# Patient Record
Sex: FEMALE | Race: WHITE | NOT HISPANIC OR LATINO | Employment: FULL TIME | URBAN - METROPOLITAN AREA
[De-identification: names, ages, dates, MRNs, and addresses within clinical notes are randomized per-mention and may not be internally consistent; named-entity substitution may affect disease eponyms.]

---

## 2020-07-21 ENCOUNTER — TRANSCRIBE ORDERS (OUTPATIENT)
Dept: ADMINISTRATIVE | Facility: HOSPITAL | Age: 30
End: 2020-07-21

## 2020-07-21 DIAGNOSIS — Z31.41 FERTILITY TESTING: Primary | ICD-10-CM

## 2020-07-31 ENCOUNTER — HOSPITAL ENCOUNTER (OUTPATIENT)
Dept: GENERAL RADIOLOGY | Facility: HOSPITAL | Age: 30
Discharge: HOME | End: 2020-07-31
Admitting: SPECIALIST

## 2020-07-31 DIAGNOSIS — Z31.41 FERTILITY TESTING: ICD-10-CM

## 2020-07-31 PROCEDURE — 74740 X-RAY FEMALE GENITAL TRACT: CPT

## 2021-03-01 ENCOUNTER — LAB (OUTPATIENT)
Dept: LAB | Facility: HOSPITAL | Age: 31
End: 2021-03-01

## 2021-03-01 ENCOUNTER — TRANSCRIBE ORDERS (OUTPATIENT)
Dept: LAB | Facility: HOSPITAL | Age: 31
End: 2021-03-01

## 2021-03-01 DIAGNOSIS — Z32.00 PREGNANCY EXAMINATION OR TEST, PREGNANCY UNCONFIRMED: ICD-10-CM

## 2021-03-01 DIAGNOSIS — Z32.00 PREGNANCY EXAMINATION OR TEST, PREGNANCY UNCONFIRMED: Primary | ICD-10-CM

## 2021-03-01 LAB — HCG INTACT+B SERPL-ACNC: 12.39 MIU/ML

## 2021-03-01 PROCEDURE — 36415 COLL VENOUS BLD VENIPUNCTURE: CPT

## 2021-03-01 PROCEDURE — 84702 CHORIONIC GONADOTROPIN TEST: CPT

## 2021-03-05 ENCOUNTER — LAB (OUTPATIENT)
Dept: LAB | Facility: HOSPITAL | Age: 31
End: 2021-03-05

## 2021-03-05 DIAGNOSIS — Z32.00 PREGNANCY EXAMINATION OR TEST, PREGNANCY UNCONFIRMED: Primary | ICD-10-CM

## 2021-03-05 LAB — HCG INTACT+B SERPL-ACNC: 6.42 MIU/ML

## 2021-03-05 PROCEDURE — 36415 COLL VENOUS BLD VENIPUNCTURE: CPT

## 2021-03-05 PROCEDURE — 84702 CHORIONIC GONADOTROPIN TEST: CPT

## 2021-03-25 ENCOUNTER — LAB (OUTPATIENT)
Dept: LAB | Facility: HOSPITAL | Age: 31
End: 2021-03-25

## 2021-03-25 ENCOUNTER — TRANSCRIBE ORDERS (OUTPATIENT)
Dept: LAB | Facility: HOSPITAL | Age: 31
End: 2021-03-25

## 2021-03-25 DIAGNOSIS — O03.0: ICD-10-CM

## 2021-03-25 DIAGNOSIS — O03.0: Primary | ICD-10-CM

## 2021-03-25 LAB — HCG INTACT+B SERPL-ACNC: <0.5 MIU/ML

## 2021-03-25 PROCEDURE — 84702 CHORIONIC GONADOTROPIN TEST: CPT

## 2021-03-25 PROCEDURE — 36415 COLL VENOUS BLD VENIPUNCTURE: CPT

## 2021-07-01 ENCOUNTER — LAB (OUTPATIENT)
Dept: LAB | Facility: HOSPITAL | Age: 31
End: 2021-07-01

## 2021-07-01 ENCOUNTER — TRANSCRIBE ORDERS (OUTPATIENT)
Dept: LAB | Facility: HOSPITAL | Age: 31
End: 2021-07-01

## 2021-07-01 DIAGNOSIS — Z32.00 PREGNANCY EXAMINATION OR TEST, PREGNANCY UNCONFIRMED: Primary | ICD-10-CM

## 2021-07-01 DIAGNOSIS — Z32.00 PREGNANCY EXAMINATION OR TEST, PREGNANCY UNCONFIRMED: ICD-10-CM

## 2021-07-01 LAB — HCG INTACT+B SERPL-ACNC: 36.06 MIU/ML

## 2021-07-01 PROCEDURE — 36415 COLL VENOUS BLD VENIPUNCTURE: CPT

## 2021-07-01 PROCEDURE — 84702 CHORIONIC GONADOTROPIN TEST: CPT

## 2021-07-06 ENCOUNTER — TRANSCRIBE ORDERS (OUTPATIENT)
Dept: LAB | Facility: HOSPITAL | Age: 31
End: 2021-07-06

## 2021-07-06 ENCOUNTER — LAB (OUTPATIENT)
Dept: LAB | Facility: HOSPITAL | Age: 31
End: 2021-07-06

## 2021-07-06 DIAGNOSIS — Z32.01 PREGNANCY EXAMINATION OR TEST, POSITIVE RESULT: Primary | ICD-10-CM

## 2021-07-06 DIAGNOSIS — Z32.01 PREGNANCY EXAMINATION OR TEST, POSITIVE RESULT: ICD-10-CM

## 2021-07-06 LAB — HCG INTACT+B SERPL-ACNC: 292.9 MIU/ML

## 2021-07-06 PROCEDURE — 84702 CHORIONIC GONADOTROPIN TEST: CPT

## 2021-07-06 PROCEDURE — 36415 COLL VENOUS BLD VENIPUNCTURE: CPT

## 2021-08-16 ENCOUNTER — TRANSCRIBE ORDERS (OUTPATIENT)
Dept: LAB | Facility: HOSPITAL | Age: 31
End: 2021-08-16

## 2021-08-16 ENCOUNTER — LAB (OUTPATIENT)
Dept: LAB | Facility: HOSPITAL | Age: 31
End: 2021-08-16

## 2021-08-16 DIAGNOSIS — Z3A.10 10 WEEKS GESTATION OF PREGNANCY: ICD-10-CM

## 2021-08-16 DIAGNOSIS — Z34.81 PRENATAL CARE, SUBSEQUENT PREGNANCY, FIRST TRIMESTER: ICD-10-CM

## 2021-08-16 DIAGNOSIS — Z3A.10 10 WEEKS GESTATION OF PREGNANCY: Primary | ICD-10-CM

## 2021-08-16 LAB
ABO GROUP BLD: NORMAL
ALP SERPL-CCNC: 77 U/L (ref 39–117)
ALT SERPL W P-5'-P-CCNC: 12 U/L (ref 1–33)
AMPHET+METHAMPHET UR QL: NEGATIVE
AMPHETAMINES UR QL: NEGATIVE
AST SERPL-CCNC: 10 U/L (ref 1–32)
BARBITURATES UR QL SCN: NEGATIVE
BASOPHILS # BLD AUTO: 0.03 10*3/MM3 (ref 0–0.2)
BASOPHILS NFR BLD AUTO: 0.4 % (ref 0–1.5)
BENZODIAZ UR QL SCN: NEGATIVE
BILIRUB SERPL-MCNC: 0.3 MG/DL (ref 0–1.2)
BILIRUB UR QL STRIP: NEGATIVE
BILIRUB UR QL STRIP: NEGATIVE
BLD GP AB SCN SERPL QL: NEGATIVE
BUPRENORPHINE SERPL-MCNC: NEGATIVE NG/ML
CANNABINOIDS SERPL QL: NEGATIVE
CLARITY UR: CLEAR
CLARITY UR: CLEAR
COCAINE UR QL: NEGATIVE
COLOR UR: YELLOW
COLOR UR: YELLOW
CREAT SERPL-MCNC: 0.66 MG/DL (ref 0.57–1)
DEPRECATED RDW RBC AUTO: 41.2 FL (ref 37–54)
EOSINOPHIL # BLD AUTO: 0.09 10*3/MM3 (ref 0–0.4)
EOSINOPHIL NFR BLD AUTO: 1.2 % (ref 0.3–6.2)
ERYTHROCYTE [DISTWIDTH] IN BLOOD BY AUTOMATED COUNT: 13.7 % (ref 12.3–15.4)
GLUCOSE SERPL-MCNC: 99 MG/DL (ref 65–99)
GLUCOSE UR STRIP-MCNC: NEGATIVE MG/DL
GLUCOSE UR STRIP-MCNC: NEGATIVE MG/DL
HBV SURFACE AG SERPL QL IA: NORMAL
HCT VFR BLD AUTO: 36.1 % (ref 34–46.6)
HCV AB SER DONR QL: NORMAL
HGB BLD-MCNC: 12.2 G/DL (ref 12–15.9)
HGB UR QL STRIP.AUTO: NEGATIVE
HGB UR QL STRIP.AUTO: NEGATIVE
HIV1+2 AB SER QL: NORMAL
IMM GRANULOCYTES # BLD AUTO: 0.02 10*3/MM3 (ref 0–0.05)
IMM GRANULOCYTES NFR BLD AUTO: 0.3 % (ref 0–0.5)
KETONES UR QL STRIP: NEGATIVE
KETONES UR QL STRIP: NEGATIVE
LDH SERPL-CCNC: 117 U/L (ref 135–214)
LEUKOCYTE ESTERASE UR QL STRIP.AUTO: NEGATIVE
LEUKOCYTE ESTERASE UR QL STRIP.AUTO: NEGATIVE
LYMPHOCYTES # BLD AUTO: 2.13 10*3/MM3 (ref 0.7–3.1)
LYMPHOCYTES NFR BLD AUTO: 29.1 % (ref 19.6–45.3)
MCH RBC QN AUTO: 28 PG (ref 26.6–33)
MCHC RBC AUTO-ENTMCNC: 33.8 G/DL (ref 31.5–35.7)
MCV RBC AUTO: 82.8 FL (ref 79–97)
METHADONE UR QL SCN: NEGATIVE
MONOCYTES # BLD AUTO: 0.61 10*3/MM3 (ref 0.1–0.9)
MONOCYTES NFR BLD AUTO: 8.3 % (ref 5–12)
NEUTROPHILS NFR BLD AUTO: 4.44 10*3/MM3 (ref 1.7–7)
NEUTROPHILS NFR BLD AUTO: 60.7 % (ref 42.7–76)
NITRITE UR QL STRIP: NEGATIVE
NITRITE UR QL STRIP: NEGATIVE
NRBC BLD AUTO-RTO: 0 /100 WBC (ref 0–0.2)
OPIATES UR QL: NEGATIVE
OXYCODONE UR QL SCN: NEGATIVE
PCP UR QL SCN: NEGATIVE
PH UR STRIP.AUTO: 7 [PH] (ref 5–8)
PH UR STRIP.AUTO: 7 [PH] (ref 5–8)
PLATELET # BLD AUTO: 309 10*3/MM3 (ref 140–450)
PMV BLD AUTO: 10.6 FL (ref 6–12)
PROPOXYPH UR QL: NEGATIVE
PROT UR QL STRIP: NEGATIVE
PROT UR QL STRIP: NEGATIVE
RBC # BLD AUTO: 4.36 10*6/MM3 (ref 3.77–5.28)
RH BLD: POSITIVE
SP GR UR STRIP: 1.01 (ref 1–1.03)
SP GR UR STRIP: 1.01 (ref 1–1.03)
TRICYCLICS UR QL SCN: NEGATIVE
URATE SERPL-MCNC: 3.6 MG/DL (ref 2.4–5.7)
UROBILINOGEN UR QL STRIP: NORMAL
UROBILINOGEN UR QL STRIP: NORMAL
WBC # BLD AUTO: 7.32 10*3/MM3 (ref 3.4–10.8)

## 2021-08-16 PROCEDURE — 80081 OBSTETRIC PANEL INC HIV TSTG: CPT

## 2021-08-16 PROCEDURE — 84460 ALANINE AMINO (ALT) (SGPT): CPT

## 2021-08-16 PROCEDURE — 86803 HEPATITIS C AB TEST: CPT

## 2021-08-16 PROCEDURE — 83615 LACTATE (LD) (LDH) ENZYME: CPT

## 2021-08-16 PROCEDURE — 82947 ASSAY GLUCOSE BLOOD QUANT: CPT

## 2021-08-16 PROCEDURE — 84450 TRANSFERASE (AST) (SGOT): CPT

## 2021-08-16 PROCEDURE — 82247 BILIRUBIN TOTAL: CPT

## 2021-08-16 PROCEDURE — 84550 ASSAY OF BLOOD/URIC ACID: CPT

## 2021-08-16 PROCEDURE — 82565 ASSAY OF CREATININE: CPT

## 2021-08-16 PROCEDURE — 82570 ASSAY OF URINE CREATININE: CPT

## 2021-08-16 PROCEDURE — 81003 URINALYSIS AUTO W/O SCOPE: CPT

## 2021-08-16 PROCEDURE — 84075 ASSAY ALKALINE PHOSPHATASE: CPT

## 2021-08-16 PROCEDURE — 80306 DRUG TEST PRSMV INSTRMNT: CPT

## 2021-08-16 PROCEDURE — 36415 COLL VENOUS BLD VENIPUNCTURE: CPT

## 2021-08-17 LAB
CREAT UR-MCNC: 72.5 MG/DL
RPR SER QL: NORMAL

## 2021-08-18 LAB — RUBV IGG SERPL IA-ACNC: 6.47 INDEX

## 2021-11-18 ENCOUNTER — HOSPITAL ENCOUNTER (OUTPATIENT)
Facility: HOSPITAL | Age: 31
Setting detail: OBSERVATION
Discharge: HOME | End: 2021-11-19
Attending: OBSTETRICS & GYNECOLOGY | Admitting: OBSTETRICS & GYNECOLOGY

## 2021-11-18 PROBLEM — O46.92 ANTEPARTUM BLEEDING, SECOND TRIMESTER: Status: ACTIVE | Noted: 2021-11-18

## 2021-11-18 LAB
ABO GROUP BLD: NORMAL
ALP SERPL-CCNC: 88 U/L (ref 39–117)
ALT SERPL W P-5'-P-CCNC: 21 U/L (ref 1–33)
AST SERPL-CCNC: 11 U/L (ref 1–32)
BACTERIA UR QL AUTO: ABNORMAL /HPF
BILIRUB SERPL-MCNC: 0.2 MG/DL (ref 0–1.2)
BILIRUB UR QL STRIP: NEGATIVE
BILIRUB UR QL STRIP: NEGATIVE
BLD GP AB SCN SERPL QL: NEGATIVE
CLARITY UR: ABNORMAL
CLARITY UR: CLEAR
COLOR UR: YELLOW
COLOR UR: YELLOW
CREAT SERPL-MCNC: 0.52 MG/DL (ref 0.57–1)
DEPRECATED RDW RBC AUTO: 41.3 FL (ref 37–54)
ERYTHROCYTE [DISTWIDTH] IN BLOOD BY AUTOMATED COUNT: 13.4 % (ref 12.3–15.4)
FIBRINOGEN PPP-MCNC: 574 MG/DL (ref 220–470)
GLUCOSE UR STRIP-MCNC: NEGATIVE MG/DL
GLUCOSE UR STRIP-MCNC: NEGATIVE MG/DL
HCT VFR BLD AUTO: 31.4 % (ref 34–46.6)
HGB BLD-MCNC: 10.6 G/DL (ref 12–15.9)
HGB UR QL STRIP.AUTO: ABNORMAL
HGB UR QL STRIP.AUTO: NEGATIVE
HYALINE CASTS UR QL AUTO: ABNORMAL /LPF
KETONES UR QL STRIP: NEGATIVE
KETONES UR QL STRIP: NEGATIVE
LDH SERPL-CCNC: 101 U/L (ref 135–214)
LEUKOCYTE ESTERASE UR QL STRIP.AUTO: ABNORMAL
LEUKOCYTE ESTERASE UR QL STRIP.AUTO: NEGATIVE
MCH RBC QN AUTO: 28.4 PG (ref 26.6–33)
MCHC RBC AUTO-ENTMCNC: 33.8 G/DL (ref 31.5–35.7)
MCV RBC AUTO: 84.2 FL (ref 79–97)
NITRITE UR QL STRIP: NEGATIVE
NITRITE UR QL STRIP: NEGATIVE
PH UR STRIP.AUTO: 5.5 [PH] (ref 5–8)
PH UR STRIP.AUTO: 6 [PH] (ref 5–8)
PLATELET # BLD AUTO: 292 10*3/MM3 (ref 140–450)
PMV BLD AUTO: 9.8 FL (ref 6–12)
PROT UR QL STRIP: NEGATIVE
PROT UR QL STRIP: NEGATIVE
RBC # BLD AUTO: 3.73 10*6/MM3 (ref 3.77–5.28)
RBC # UR STRIP: ABNORMAL /HPF
REF LAB TEST METHOD: ABNORMAL
RH BLD: POSITIVE
SARS-COV-2 RDRP RESP QL NAA+PROBE: NORMAL
SP GR UR STRIP: 1.02 (ref 1–1.03)
SP GR UR STRIP: <=1.005 (ref 1–1.03)
SQUAMOUS #/AREA URNS HPF: ABNORMAL /HPF
T&S EXPIRATION DATE: NORMAL
URATE SERPL-MCNC: 3.9 MG/DL (ref 2.4–5.7)
UROBILINOGEN UR QL STRIP: ABNORMAL
UROBILINOGEN UR QL STRIP: NORMAL
WBC # UR STRIP: ABNORMAL /HPF
WBC NRBC COR # BLD: 10.96 10*3/MM3 (ref 3.4–10.8)

## 2021-11-18 PROCEDURE — 86850 RBC ANTIBODY SCREEN: CPT | Performed by: OBSTETRICS & GYNECOLOGY

## 2021-11-18 PROCEDURE — 83615 LACTATE (LD) (LDH) ENZYME: CPT | Performed by: OBSTETRICS & GYNECOLOGY

## 2021-11-18 PROCEDURE — 84075 ASSAY ALKALINE PHOSPHATASE: CPT | Performed by: OBSTETRICS & GYNECOLOGY

## 2021-11-18 PROCEDURE — 84450 TRANSFERASE (AST) (SGOT): CPT | Performed by: OBSTETRICS & GYNECOLOGY

## 2021-11-18 PROCEDURE — 84550 ASSAY OF BLOOD/URIC ACID: CPT | Performed by: OBSTETRICS & GYNECOLOGY

## 2021-11-18 PROCEDURE — 85384 FIBRINOGEN ACTIVITY: CPT | Performed by: OBSTETRICS & GYNECOLOGY

## 2021-11-18 PROCEDURE — 99219 PR INITIAL OBSERVATION CARE/DAY 50 MINUTES: CPT | Performed by: OBSTETRICS & GYNECOLOGY

## 2021-11-18 PROCEDURE — 81001 URINALYSIS AUTO W/SCOPE: CPT | Performed by: OBSTETRICS & GYNECOLOGY

## 2021-11-18 PROCEDURE — 86900 BLOOD TYPING SEROLOGIC ABO: CPT | Performed by: OBSTETRICS & GYNECOLOGY

## 2021-11-18 PROCEDURE — G0378 HOSPITAL OBSERVATION PER HR: HCPCS

## 2021-11-18 PROCEDURE — 87635 SARS-COV-2 COVID-19 AMP PRB: CPT | Performed by: OBSTETRICS & GYNECOLOGY

## 2021-11-18 PROCEDURE — 86901 BLOOD TYPING SEROLOGIC RH(D): CPT | Performed by: OBSTETRICS & GYNECOLOGY

## 2021-11-18 PROCEDURE — 84460 ALANINE AMINO (ALT) (SGPT): CPT | Performed by: OBSTETRICS & GYNECOLOGY

## 2021-11-18 PROCEDURE — 85027 COMPLETE CBC AUTOMATED: CPT | Performed by: OBSTETRICS & GYNECOLOGY

## 2021-11-18 PROCEDURE — 82247 BILIRUBIN TOTAL: CPT | Performed by: OBSTETRICS & GYNECOLOGY

## 2021-11-18 PROCEDURE — 81003 URINALYSIS AUTO W/O SCOPE: CPT | Performed by: OBSTETRICS & GYNECOLOGY

## 2021-11-18 PROCEDURE — C9803 HOPD COVID-19 SPEC COLLECT: HCPCS

## 2021-11-18 PROCEDURE — 82565 ASSAY OF CREATININE: CPT | Performed by: OBSTETRICS & GYNECOLOGY

## 2021-11-18 RX ORDER — ASPIRIN 81 MG/1
81 TABLET, CHEWABLE ORAL DAILY
COMMUNITY
End: 2022-03-11 | Stop reason: HOSPADM

## 2021-11-18 RX ORDER — ONDANSETRON 2 MG/ML
4 INJECTION INTRAMUSCULAR; INTRAVENOUS EVERY 8 HOURS PRN
Status: DISCONTINUED | OUTPATIENT
Start: 2021-11-18 | End: 2021-11-19 | Stop reason: HOSPADM

## 2021-11-18 RX ORDER — ONDANSETRON 4 MG/1
4 TABLET, FILM COATED ORAL EVERY 8 HOURS PRN
Status: DISCONTINUED | OUTPATIENT
Start: 2021-11-18 | End: 2021-11-19 | Stop reason: HOSPADM

## 2021-11-18 RX ORDER — PROMETHAZINE HYDROCHLORIDE 25 MG/1
25 TABLET ORAL 2 TIMES DAILY PRN
COMMUNITY
Start: 2021-08-12 | End: 2022-03-11 | Stop reason: HOSPADM

## 2021-11-18 RX ORDER — ACETAMINOPHEN 325 MG/1
650 TABLET ORAL EVERY 4 HOURS PRN
Status: DISCONTINUED | OUTPATIENT
Start: 2021-11-18 | End: 2021-11-19 | Stop reason: HOSPADM

## 2021-11-18 RX ORDER — LABETALOL 200 MG/1
200 TABLET, FILM COATED ORAL EVERY 12 HOURS SCHEDULED
Status: DISCONTINUED | OUTPATIENT
Start: 2021-11-18 | End: 2021-11-19 | Stop reason: HOSPADM

## 2021-11-18 RX ORDER — LABETALOL 200 MG/1
200 TABLET, FILM COATED ORAL 2 TIMES DAILY
COMMUNITY

## 2021-11-18 RX ORDER — PANTOPRAZOLE SODIUM 20 MG/1
20 TABLET, DELAYED RELEASE ORAL DAILY PRN
COMMUNITY

## 2021-11-18 RX ORDER — HYDROXYZINE HYDROCHLORIDE 25 MG/1
50 TABLET, FILM COATED ORAL NIGHTLY PRN
Status: DISCONTINUED | OUTPATIENT
Start: 2021-11-18 | End: 2021-11-19 | Stop reason: HOSPADM

## 2021-11-18 RX ORDER — PRENATAL WITH FERROUS FUM AND FOLIC ACID 3080; 920; 120; 400; 22; 1.84; 3; 20; 10; 1; 12; 200; 27; 25; 2 [IU]/1; [IU]/1; MG/1; [IU]/1; MG/1; MG/1; MG/1; MG/1; MG/1; MG/1; UG/1; MG/1; MG/1; MG/1; MG/1
1 TABLET ORAL DAILY
COMMUNITY

## 2021-11-18 RX ADMIN — LABETALOL HYDROCHLORIDE 200 MG: 200 TABLET, FILM COATED ORAL at 20:36

## 2021-11-18 NOTE — H&P
[facility]  Obstetric History and Physical    Chief Complaint   Patient presents with   • Vaginal Bleeding       Subjective     Patient is a 31 y.o. female  currently at 23w5d, who presents with complaints of lower abdominal cramping and onset of vaginal bleeding.  She denies recent trauma or intercourse.  No fever/chills, nausea/vomiting, dysuria, constipation, cough, shortness of breath or chest pain.    Her prenatal care is benign to date. Her previous obstetric/gynecological history is notable for 1 prior spontaneous .    The following portions of the patients history were reviewed and updated as appropriate: current medications, allergies, past medical history, past surgical history, past family history, past social history and problem list .        Prenatal Information:   Maternal Prenatal Labs  Blood Type No results found for: ABO   Rh Status No results found for: RH   Antibody Screen No results found for: ABSCRN   Gonnorhea No results found for: GCCX   Chlamydia No results found for: CLAMYDCU   RPR No results found for: RPR   Syphilis Antibody No results found for: SYPHILIS   Rubella No results found for: RUBELLAIGGIN   Hepatitis B Surface Antigen No results found for: HEPBSAG   HIV-1 Antibody No results found for: LABHIV1   Hepatitis C Antibody No results found for: HEPCAB   Rapid Urin Drug Screen No results found for: AMPMETHU, BARBITSCNUR, LABBENZSCN, LABMETHSCN, LABOPIASCN, THCURSCR, COCAINEUR, AMPHETSCREEN, PROPOXSCN, BUPRENORSCNU, METAMPSCNUR, OXYCODONESCN, TRICYCLICSCN   Group B Strep Culture No results found for: GBSANTIGEN           External Prenatal Results     Pregnancy Outside Results - Transcribed From Office Records - See Scanned Records For Details     Test Value Date Time    ABO  A  21 1201    Rh  Positive  21 1201    Antibody Screen  Negative  21 1201    Varicella IgG       Rubella  6.47 index 21 1201    Hgb  12.2 g/dL 21 1201    Hct  36.1 %  21 1201    Glucose Fasting GTT       Glucose Tolerance Test 1 hour       Glucose Tolerance Test 3 hour       Gonorrhea (discrete)       Chlamydia (discrete)       RPR  Non-Reactive  21 1201    VDRL       Syphilis Antibody       HBsAg  Non-Reactive  21 1201    Herpes Simplex Virus PCR       Herpes Simplex VIrus Culture       HIV  Non-Reactive  21 1201    Hep C RNA Quant PCR       Hep C Antibody  Non-Reactive  21 1201    AFP       Group B Strep       GBS Susceptibility to Clindamycin       GBS Susceptibility to Erythromycin       Fetal Fibronectin       Genetic Testing, Maternal Blood             Drug Screening     Test Value Date Time    Urine Drug Screen       Amphetamine Screen  Negative  21 1201    Barbiturate Screen  Negative  21 1201    Benzodiazepine Screen  Negative  21 1201    Methadone Screen  Negative  21 1201    Phencyclidine Screen  Negative  21 1201    Opiates Screen  Negative  21 1201    THC Screen  Negative  21 1201    Cocaine Screen       Propoxyphene Screen  Negative  21 1201    Buprenorphine Screen  Negative  21 1201    Methamphetamine Screen       Oxycodone Screen  Negative  21 1201    Tricyclic Antidepressants Screen  Negative  21 1201          Legend    ^: Historical                          Past OB History:       OB History    Para Term  AB Living   2 0 0 0 1 0   SAB IAB Ectopic Molar Multiple Live Births   0 0 0 0 0 0      # Outcome Date GA Lbr Rakan/2nd Weight Sex Delivery Anes PTL Lv   2 Current            1 AB  5w0d    Spon misscar          Past Medical History:  Past Medical History:   Diagnosis Date   • Acid reflux    • Amenorrhea 01/01/2014    X2 MO   • Anxiety    • Endometriosis     STAGE 4  5/27/15  ASHMUN   • Heart murmur    • Hx of colonoscopy 2016    CLEAR   • Hypertension    • Hypertension    • Ovarian cyst 2014    12/14 X 3 ON R OVARY SEE US REPORT   • Papanicolaou  smear 03/30/2018    NEG   • Pelvic pain    • Screening breast examination     ADMITS      Past Surgical History Past Surgical History:   Procedure Laterality Date   • COLONOSCOPY  01/01/2016    NORMAL   • DIAGNOSTIC LAPAROSCOPY  05/27/2015   • HX OVARIAN CYSTECTOMY Bilateral 05/27/2015   • WISDOM TOOTH EXTRACTION        Family History: Family History   Problem Relation Age of Onset   • Other Mother         ABNORMAL PAP SMEAR   • Hypertension Mother    • Hypertension Father    • Other Paternal Grandmother         HYSTERECTOMY AGE 40 GYN DISORDER   • Heart disease Paternal Grandmother    • Osteoporosis Paternal Grandmother    • Colon cancer Paternal Grandfather       Social History:  reports that she has never smoked. She has never used smokeless tobacco.   reports no history of alcohol use.   reports no history of drug use.   Allergies: Patient has no known allergies.  Current Medications:          No current facility-administered medications on file prior to encounter.     Current Outpatient Medications on File Prior to Encounter   Medication Sig Dispense Refill   • aspirin 81 MG chewable tablet Chew 81 mg Daily.     • labetalol (NORMODYNE) 200 MG tablet Take 200 mg by mouth 2 (Two) Times a Day.     • pantoprazole (PROTONIX) 20 MG EC tablet Take 20 mg by mouth Daily As Needed for Heartburn.     • Prenatal Vit-Fe Fumarate-FA (Prenatal 27-1) 27-1 MG tablet tablet Take 1 tablet by mouth Daily.     • promethazine (PHENERGAN) 25 MG tablet Take 25 mg by mouth 2 (Two) Times a Day As Needed.     • [DISCONTINUED] NIFEdipine (PROCARDIA) 10 MG capsule Take 10 mg by mouth 3 (Three) Times a Day.         General ROS: Pertinent items are noted in HPI, all other systems reviewed and negative    Objective       Vital Signs Range for the last 24 hours  Temperature: Temp:  [98.3 °F (36.8 °C)] 98.3 °F (36.8 °C)   Temp Source: Temp src: Oral   BP: BP: (132)/(87) 132/87   Pulse: Heart Rate:  [95] 95   Respirations: Resp:  [16] 16   SPO2:  SpO2:  [97 %-98 %] 98 %   O2 Amount (l/min):     O2 Devices Device (Oxygen Therapy): room air   Weight: Weight:  [103 kg (228 lb)] 103 kg (228 lb)     Physical Examination: General appearance - alert, well appearing, and in no distress, oriented to person, place, and time and overweight  Mental status - alert, oriented to person, place, and time, normal mood, behavior, speech, dress, motor activity, and thought processes  Eyes - pupils equal and reactive, extraocular eye movements intact, sclera anicteric  Neck - supple, no significant adenopathy, thyroid exam: thyroid is normal in size without nodules or tenderness  Chest - clear to auscultation, no wheezes, rales or rhonchi, symmetric air entry  Heart - normal rate, regular rhythm, normal S1, S2, no murmurs, rubs, clicks or gallops  Abdomen-soft, nontender, nondistended, no masses or organomegaly   Abdomen, Non-Tender  Pelvic - VULVA: normal appearing vulva with no masses, tenderness or lesions, CERVIX: Cervix closed but somewhat soft to digital palpation.  Small amount of dark blood noted.  Neurological - alert, oriented, normal speech, no focal findings or movement disorder noted, DTR's normal and symmetric  Extremities - no pedal edema noted    Fetal Heart Rate Assessment: Fetal heart tones confirmed with Doppler.        Laboratory Results:   Lab Results   Component Value Date    ALKPHOS 77 2021    ALT 12 2021    AST 10 2021    CREATININE 0.66 2021    BILITOT 0.3 2021     (L) 2021    URICACID 3.6 2021       No results found for: WBC, RBC, HGB, HCT, MCV, MCH, MCHC, RDW, RDWSD, MPV, PLT, NEUTRORELPCT, LYMPHORELPCT, MONORELPCT, EOSRELPCT, BASORELPCT, AUTOIGPER, NEUTROABS, LYMPHSABS, MONOSABS, EOSABS, BASOSABS, AUTOIGNUM, NRBC          Brief Urine Lab Results  (Last result in the past 365 days)      Color   Clarity   Blood   Leuk Est   Nitrite   Protein   CREAT   Urine HCG        21 1830 Yellow   Clear    "Negative   Negative   Negative   Negative                   Radiology Review: Bedside ultrasound performed with hand-held unit.  Placenta posterior and mid position.  Cervix visualized and measures 2-2.5 cm.  Did not note cervical funneling.  Other Studies: Maternal blood type Rh+.  CBC, fibrinogen, type and screen and PEP    Assessment/Plan       Antepartum bleeding, second trimester        Assessment:  1. Antepartum bleeding with questionable shortened cervix.    Plan:  1. Overnight admission.  2. PDC ultrasound tomorrow morning.     Total time spent today with Kasey  was 30-39 minutes (level 4).  Of this time, > 50% was spent face-to-face time coordinating care, answering her questions and counseling regarding pathophysiology of her presenting problem along with plans for any diagnostic work-up and treatment.        Bacilio Bowling \"[facility]\" ORIANA Kothari MD  11/18/2021  18:57 EST    "

## 2021-11-19 ENCOUNTER — APPOINTMENT (OUTPATIENT)
Dept: WOMENS IMAGING | Facility: HOSPITAL | Age: 31
End: 2021-11-19

## 2021-11-19 ENCOUNTER — HOSPITAL ENCOUNTER (OUTPATIENT)
Facility: HOSPITAL | Age: 31
End: 2021-11-19
Attending: OBSTETRICS & GYNECOLOGY | Admitting: OBSTETRICS & GYNECOLOGY

## 2021-11-19 VITALS
HEIGHT: 67 IN | TEMPERATURE: 98.8 F | DIASTOLIC BLOOD PRESSURE: 66 MMHG | SYSTOLIC BLOOD PRESSURE: 123 MMHG | BODY MASS INDEX: 35.79 KG/M2 | RESPIRATION RATE: 18 BRPM | WEIGHT: 228 LBS | HEART RATE: 94 BPM | OXYGEN SATURATION: 98 %

## 2021-11-19 PROCEDURE — G0378 HOSPITAL OBSERVATION PER HR: HCPCS

## 2021-11-19 PROCEDURE — 76811 OB US DETAILED SNGL FETUS: CPT | Performed by: OBSTETRICS & GYNECOLOGY

## 2021-11-19 PROCEDURE — 76817 TRANSVAGINAL US OBSTETRIC: CPT | Performed by: OBSTETRICS & GYNECOLOGY

## 2021-11-19 PROCEDURE — 76811 OB US DETAILED SNGL FETUS: CPT

## 2021-11-19 NOTE — PLAN OF CARE
Problem: Adult Inpatient Plan of Care  Goal: Plan of Care Review  Outcome: Met  Flowsheets (Taken 11/19/2021 1031)  Progress: improving  Plan of Care Reviewed With:   patient   spouse  Goal: Patient-Specific Goal (Individualized)  Outcome: Met  Goal: Absence of Hospital-Acquired Illness or Injury  Outcome: Met  Intervention: Identify and Manage Fall Risk  Description: Perform standard risk assessment with a validated tool or comprehensive approach appropriate to the patient on admission; reassess fall risk frequently, with change in status or transfer to another level of care.  Communicate fall injury risk to interprofessional healthcare team.  Determine need for increased observation, equipment and environmental modification, such as low bed and signage, as well as supportive, nonskid footwear.  Adjust safety measures to individual developmental age, stage and identified risk factors.  Reinforce the importance of safety and physical activity with patient and family.  Perform regular intentional rounding to assess need for position change, pain assessment, personal needs, including assistance with toileting.  Recent Flowsheet Documentation  Taken 11/19/2021 0920 by Loreta Jimenes, RN  Safety Promotion/Fall Prevention:   safety round/check completed   clutter free environment maintained   assistive device/personal items within reach   fall prevention program maintained   nonskid shoes/slippers when out of bed  Taken 11/19/2021 0820 by Loreta Jimenes, RN  Safety Promotion/Fall Prevention:   safety round/check completed   assistive device/personal items within reach   clutter free environment maintained   fall prevention program maintained   nonskid shoes/slippers when out of bed  Taken 11/19/2021 0727 by Loreta Jimenes, RN  Safety Promotion/Fall Prevention:   safety round/check completed   assistive device/personal items within reach   clutter free environment maintained   fall prevention program maintained    nonskid shoes/slippers when out of bed  Intervention: Prevent Skin Injury  Description: Assess skin risk on admission and at regular intervals throughout hospital stay.  Keep all areas of skin (especially folds) clean and dry.  Maintain adequate skin hydration.  Relieve and redistribute pressure and protect bony prominences; implement measures based on patient-specific risk factors.  Match turning and repositioning schedule to clinical condition.  Encourage weight shift frequently; assist with reposition if unable to complete independently.  Float heels off bed. Avoid pressure on the Achilles tendon.  Keep skin free from extended contact with medical devices.  Use aids (e.g., slide boards, mechanical lift) during transfer.  Recent Flowsheet Documentation  Taken 11/19/2021 0727 by Loreta Jimenes RN  Body Position: sitting up in bed  Intervention: Prevent Infection  Description: Maintain skin and mucous membrane integrity; promote hand, oral and pulmonary hygiene.  Optimize fluid balance, nutrition, sleep and glycemic control to maximize infection resistance.  Identify potential sources of infection early to prevent or mitigate progression of infection (e.g., wound, lines, devices).  Evaluate ongoing need for invasive devices; remove promptly when no longer indicated.  Recent Flowsheet Documentation  Taken 11/19/2021 0920 by Loreta Jimenes, RN  Infection Prevention: personal protective equipment utilized  Taken 11/19/2021 0820 by Loreta Jimenes RN  Infection Prevention: personal protective equipment utilized  Taken 11/19/2021 0727 by Loreta Jimenes, RN  Infection Prevention: personal protective equipment utilized  Goal: Optimal Comfort and Wellbeing  Outcome: Met  Intervention: Provide Person-Centered Care  Description: Use a family-focused approach to care.  Develop trust and rapport by proactively providing information, encouraging questions, addressing concerns and offering reassurance.  Acknowledge emotional  response to hospitalization.  Recognize and utilize personal coping strategies.  Honor spiritual and cultural preferences.  Recent Flowsheet Documentation  Taken 11/19/2021 0727 by Loreta Jimenes RN  Trust Relationship/Rapport:   care explained   questions encouraged   questions answered   thoughts/feelings acknowledged  Goal: Readiness for Transition of Care  Outcome: Met  Intervention: Mutually Develop Transition Plan  Description: Identify available resources for support (e.g., family, friends, community).  Identify and address barriers to ongoing treatment and home management (e.g., environmental, financial).  Provide opportunities to practice self-management skills.  Assess and monitor emotional readiness for transition.  Establish/reconnect linkage with outpatient providers or community-based services.  Recent Flowsheet Documentation  Taken 11/19/2021 0905 by Loreta Jimenes, RN  Equipment Needed After Discharge: none  Equipment Currently Used at Home: none  Anticipated Changes Related to Illness: none  Transportation Anticipated: family or friend will provide  Concerns to be Addressed: no discharge needs identified  Readmission Within the Last 30 Days: no previous admission in last 30 days  Patient/Family Anticipated Services at Transition: none  Patient/Family Anticipates Transition to: home     Problem: Bleeding Antepartum  Goal: Absence of Bleeding  Outcome: Met   Goal Outcome Evaluation:  Plan of Care Reviewed With: patient, spouse        Progress: improving

## 2021-11-19 NOTE — PROGRESS NOTES
Antepartum Progress Note    Patient name: Kasey Colbert  YOB: 1990   MRN: [id number]  Admission Date: 2021  Date of Service: 2021    Kasey Colbert is a 31 y.o.    at 23w6d  admitted on 2021 for vaginal  Bleeding      Diagnoses:   Patient Active Problem List    Diagnosis    • Antepartum bleeding, second trimester [O46.92]        Subjective:      Kasey has no complaints today.  No further bleeding since admission yesterday.  Normal PDC US this AM     REVIEW OF SYSTEMS:  Reports fetal movement is normal  Headache:denies   Epigastric: denies   Visual Changes: denies   Amniotic Fluid: Denies leakage of amniotic fluid.  Presence of Vaginal Bleeding Assessed: Denies vaginal bleeding  Patient Reports: No contractions  All other systems reviewed and are negative    Objective:     Vital signs:  Temp:  [98.2 °F (36.8 °C)-98.8 °F (37.1 °C)] 98.8 °F (37.1 °C)  Heart Rate:  [90-96] 94  Resp:  [16-18] 18  BP: (115-132)/(66-87) 123/66      PHYSICAL EXAM:  General appearance - alert and in no distress  Mental status - alert, oriented to person, place, and time, normal mood, behavior, speech, dress, motor activity, affect appropriate   Abdomen - soft, nontender,   Extremities-no pedal edema noted  Skin-normal coloration and turgor, no rashes, no suspicious skin lesions noted      FHT's: Category 1  TOCO: none       Most recent ultrasound:  21 PDC    Impression  =========     Size consistent with dates.     No fetal anomalies were identified.     No fetal markers for trisomy.     The cervical length appears normal on transvaginal scan.     No ultrasound etiology for bleeeding.        Recommendation  ===============     Follow-up as clinically indicated.      Medications:         Labs:    Lab Results   Component Value Date    WBC 10.96 (H) 2021    HGB 10.6 (L) 2021    HCT 31.4 (L) 2021    MCV 84.2 2021     2021     URICACID 3.9 2021    AST 11 2021    ALT 21 2021     (L) 2021     Results from last 7 days   Lab Units 21   ABO TYPING  A   RH TYPING  Positive   ANTIBODY SCREEN  Negative       Assessment/Plan:      Kasey is a 31 y.o.    at 23w6d.  1.   Patient Active Problem List    Diagnosis    • Antepartum bleeding, second trimester [O46.92]    :  No etiology on US for VB.  Normal CL    Plan:   1. Discharge home.  Bleeding precautions reviewed.  Has OB appt in 3 days        All questions were answered to the best of my ability.    Diana Hamilton MD  2021  12:58 EST

## 2021-11-19 NOTE — DISCHARGE SUMMARY
[facility]   Discharge Summary      Patient: Kasey Colbert      MR#:[id number]  Admission  Diagnosis:   Problems Addressed this Visit     None      Diagnoses    None.     vaginal bleeding    Discharge Diagnosis: same    Date of Admission: 11/18/2021  Date of Discharge:  11/19/2021    Procedures:  none    Service:  Obstetrics    Hospital Course:  Patient admitted for vaginal bleeding.  PDC US was wnl and normal CL noted.  During that time she remained afebrile and hemodynamically stable.  On the day of discharge, she was eating, ambulating and voiding without difficulty.  Bleeding resolved.    Labs  Lab Results   Component Value Date    WBC 10.96 (H) 11/18/2021    HGB 10.6 (L) 11/18/2021    HCT 31.4 (L) 11/18/2021    MCV 84.2 11/18/2021     11/18/2021    URICACID 3.9 11/18/2021    AST 11 11/18/2021    ALT 21 11/18/2021     (L) 11/18/2021     Results from last 7 days   Lab Units 11/18/21 1931   ABO TYPING  A   RH TYPING  Positive   ANTIBODY SCREEN  Negative       Discharge Medications     Discharge Medications      Continue These Medications      Instructions Start Date   aspirin 81 MG chewable tablet   81 mg, Oral, Daily      labetalol 200 MG tablet  Commonly known as: NORMODYNE   200 mg, Oral, 2 Times Daily      pantoprazole 20 MG EC tablet  Commonly known as: PROTONIX   20 mg, Oral, Daily PRN      Prenatal 27-1 27-1 MG tablet tablet   1 tablet, Oral, Daily      promethazine 25 MG tablet  Commonly known as: PHENERGAN   25 mg, Oral, 2 Times Daily PRN             Discharge Disposition:  To Home    Discharge Condition:  Stable    Discharge Diet: regular    Activity at Discharge: pelvic rest x 1 wk    Follow-up Appointments  3 days    Diana Hamilton MD  11/19/21  13:00 EST

## 2021-11-19 NOTE — DISCHARGE INSTR - ACTIVITY
Resume regular activity with frequent rest periods. Stay on pelvic rest and no exercising for at least the next week/follow up appointment.

## 2021-12-20 ENCOUNTER — LAB (OUTPATIENT)
Dept: LAB | Facility: HOSPITAL | Age: 31
End: 2021-12-20

## 2021-12-20 ENCOUNTER — TRANSCRIBE ORDERS (OUTPATIENT)
Dept: LAB | Facility: HOSPITAL | Age: 31
End: 2021-12-20

## 2021-12-20 DIAGNOSIS — Z34.83 PRENATAL CARE, SUBSEQUENT PREGNANCY, THIRD TRIMESTER: ICD-10-CM

## 2021-12-20 DIAGNOSIS — Z3A.28 28 WEEKS GESTATION OF PREGNANCY: ICD-10-CM

## 2021-12-20 DIAGNOSIS — Z3A.28 28 WEEKS GESTATION OF PREGNANCY: Primary | ICD-10-CM

## 2021-12-20 LAB
BLD GP AB SCN SERPL QL: NEGATIVE
DEPRECATED RDW RBC AUTO: 38.6 FL (ref 37–54)
ERYTHROCYTE [DISTWIDTH] IN BLOOD BY AUTOMATED COUNT: 12.8 % (ref 12.3–15.4)
GLUCOSE 1H P 100 G GLC PO SERPL-MCNC: 202 MG/DL (ref 65–139)
HCT VFR BLD AUTO: 29 % (ref 34–46.6)
HGB BLD-MCNC: 9.8 G/DL (ref 12–15.9)
MCH RBC QN AUTO: 28.2 PG (ref 26.6–33)
MCHC RBC AUTO-ENTMCNC: 33.8 G/DL (ref 31.5–35.7)
MCV RBC AUTO: 83.3 FL (ref 79–97)
PLATELET # BLD AUTO: 294 10*3/MM3 (ref 140–450)
PMV BLD AUTO: 10.4 FL (ref 6–12)
RBC # BLD AUTO: 3.48 10*6/MM3 (ref 3.77–5.28)
WBC NRBC COR # BLD: 8.28 10*3/MM3 (ref 3.4–10.8)

## 2021-12-20 PROCEDURE — 86850 RBC ANTIBODY SCREEN: CPT

## 2021-12-20 PROCEDURE — 82962 GLUCOSE BLOOD TEST: CPT | Performed by: NURSE PRACTITIONER

## 2021-12-20 PROCEDURE — 36415 COLL VENOUS BLD VENIPUNCTURE: CPT

## 2021-12-20 PROCEDURE — 85027 COMPLETE CBC AUTOMATED: CPT

## 2021-12-20 PROCEDURE — 82950 GLUCOSE TEST: CPT

## 2021-12-30 ENCOUNTER — PRIOR AUTHORIZATION (OUTPATIENT)
Dept: ENDOCRINOLOGY | Facility: CLINIC | Age: 31
End: 2021-12-30

## 2021-12-30 ENCOUNTER — TELEPHONE (OUTPATIENT)
Dept: ENDOCRINOLOGY | Facility: CLINIC | Age: 31
End: 2021-12-30

## 2021-12-30 ENCOUNTER — OFFICE VISIT (OUTPATIENT)
Dept: ENDOCRINOLOGY | Facility: CLINIC | Age: 31
End: 2021-12-30

## 2021-12-30 VITALS
HEIGHT: 67 IN | DIASTOLIC BLOOD PRESSURE: 70 MMHG | BODY MASS INDEX: 36.41 KG/M2 | OXYGEN SATURATION: 98 % | WEIGHT: 232 LBS | HEART RATE: 86 BPM | SYSTOLIC BLOOD PRESSURE: 118 MMHG

## 2021-12-30 DIAGNOSIS — O24.813 OTHER PRE-EXISTING DIABETES MELLITUS DURING PREGNANCY IN THIRD TRIMESTER: ICD-10-CM

## 2021-12-30 DIAGNOSIS — O24.813 OTHER PRE-EXISTING DIABETES MELLITUS DURING PREGNANCY IN THIRD TRIMESTER: Primary | ICD-10-CM

## 2021-12-30 PROBLEM — N80.9 ENDOMETRIOSIS: Status: ACTIVE | Noted: 2021-12-30

## 2021-12-30 LAB
EXPIRATION DATE: NORMAL
EXPIRATION DATE: NORMAL
GLUCOSE BLDC GLUCOMTR-MCNC: 93 MG/DL (ref 70–130)
HBA1C MFR BLD: 5.9 %
Lab: NORMAL
Lab: NORMAL

## 2021-12-30 PROCEDURE — 82947 ASSAY GLUCOSE BLOOD QUANT: CPT | Performed by: INTERNAL MEDICINE

## 2021-12-30 PROCEDURE — 99203 OFFICE O/P NEW LOW 30 MIN: CPT | Performed by: INTERNAL MEDICINE

## 2021-12-30 PROCEDURE — 83036 HEMOGLOBIN GLYCOSYLATED A1C: CPT | Performed by: INTERNAL MEDICINE

## 2021-12-30 RX ORDER — FERROUS SULFATE 325(65) MG
1 TABLET ORAL 2 TIMES DAILY
COMMUNITY
Start: 2021-12-21

## 2021-12-30 RX ORDER — BLOOD-GLUCOSE METER
KIT MISCELLANEOUS
Qty: 1 EACH | Refills: 0 | Status: SHIPPED | OUTPATIENT
Start: 2021-12-30

## 2021-12-30 RX ORDER — BLOOD-GLUCOSE METER
KIT MISCELLANEOUS
Qty: 1 EACH | Refills: 0 | Status: SHIPPED | OUTPATIENT
Start: 2021-12-30 | End: 2021-12-30 | Stop reason: SDUPTHER

## 2021-12-30 NOTE — PROGRESS NOTES
Chief Complaint   Patient presents with   • Gestational Diabetes        Referring Provider  Diana Hamilton MD     HPI   Kasey Colbert is a 31 y.o. female had concerns including Gestational Diabetes.    Gestational diabetes was diagnosed 12/20/21.  OGTT results:   Prior GDM: no, first pregnancy  Weeks of gestation: 29.5  Gender: boy, measuring a week ahead    Was missing cycles related to endometriosis. Is a question of PCOS also. She was on progesterone for regular menses.   Required letrozole for ovulation.   Has no hirsutism.    Diet: drinking soda before pregnancy, has cut out coke, has a sprite on occasion for nausea, is craving carbs and sugar  Breakfast: overnight oats or oatmeal, eggs, botello, toast (whole grain)  Lunch: homemade soup or leftovers from holidays  Dinner: similar to lunch, protein (chicken or fish), vegetables, potatoes  Drinks: water, sprite on occasion, sparkling ice   Snacks: fruit (grapes, bananas, blueberries), yogurt     Past Medical History:   Diagnosis Date   • Acid reflux    • Amenorrhea 01/01/2014    X2 MO   • Anxiety    • Endometriosis     STAGE 4  5/27/15  ASHMUN   • Gestational diabetes    • Heart murmur    • Hx of colonoscopy 01/2016    CLEAR   • Hypertension    • Hypertension    • Ovarian cyst 12/01/2014    12/14 X 3 ON R OVARY SEE US REPORT   • Papanicolaou smear 03/30/2018    NEG   • Pelvic pain    • Screening breast examination     ADMITS     Past Surgical History:   Procedure Laterality Date   • COLONOSCOPY  01/01/2016    NORMAL   • DIAGNOSTIC LAPAROSCOPY  05/27/2015   • HX OVARIAN CYSTECTOMY Bilateral 05/27/2015   • WISDOM TOOTH EXTRACTION        Family History   Problem Relation Age of Onset   • Other Mother         ABNORMAL PAP SMEAR   • Hypertension Mother    • Hypertension Father    • Other Paternal Grandmother         HYSTERECTOMY AGE 40 GYN DISORDER   • Heart disease Paternal Grandmother    • Osteoporosis Paternal Grandmother    • Colon cancer  "Paternal Grandfather       Social History     Socioeconomic History   • Marital status:      Spouse name: Sridhar   Tobacco Use   • Smoking status: Never Smoker   • Smokeless tobacco: Never Used   Vaping Use   • Vaping Use: Never used   Substance and Sexual Activity   • Alcohol use: No   • Drug use: No   • Sexual activity: Yes     Partners: Male      No Known Allergies   Current Outpatient Medications on File Prior to Visit   Medication Sig Dispense Refill   • aspirin 81 MG chewable tablet Chew 81 mg Daily.     • FeroSul 325 (65 Fe) MG tablet Take 1 tablet by mouth 2 (Two) Times a Day.     • labetalol (NORMODYNE) 200 MG tablet Take 200 mg by mouth 2 (Two) Times a Day.     • pantoprazole (PROTONIX) 20 MG EC tablet Take 20 mg by mouth Daily As Needed for Heartburn.     • Prenatal Vit-Fe Fumarate-FA (Prenatal 27-1) 27-1 MG tablet tablet Take 1 tablet by mouth Daily.     • promethazine (PHENERGAN) 25 MG tablet Take 25 mg by mouth 2 (Two) Times a Day As Needed.       No current facility-administered medications on file prior to visit.        Review of Systems   Constitutional: Negative.    HENT: Negative.    Eyes: Negative.    Respiratory: Negative.    Cardiovascular: Negative.    Gastrointestinal: Positive for GERD.   Endocrine: Negative.    Genitourinary: Positive for dyspareunia, vaginal bleeding and vaginal discharge.   Musculoskeletal: Negative.    Skin: Negative.    Allergic/Immunologic: Negative.    Neurological: Negative.    Hematological: Negative.    Psychiatric/Behavioral: Negative.         /70 (BP Location: Left arm, Patient Position: Sitting, Cuff Size: Adult)   Pulse 86   Ht 170.2 cm (67\")   Wt 105 kg (232 lb)   SpO2 98%   BMI 36.34 kg/m²      Physical Exam  Constitutional:  well developed; well nourished  no acute distress   ENT/Thyroid: no thyromegaly  no palpable nodules   Eyes: EOM intact  Conjunctiva: clear   Respiratory:  breathing is unlabored  clear to auscultation bilaterally "   Cardiovascular:  regular rate and rhythm, S1, S2 normal, + murmur, no click, rub or gallop   Chest:  Not performed.   Abdomen: gravid   : Not performed.   Musculoskeletal: negative findings:  ROM of all joints is normal, no deformities present   Skin: dry and warm   Neuro: normal without focal findings and mental status, speech normal, alert and oriented x3   Psych: oriented to time, place and person, mood and affect are within normal limits     CMP:  Lab Results   Component Value Date    CREATININE 0.52 (L) 2021    BILITOT 0.2 2021    ALKPHOS 88 2021    AST 11 2021    ALT 21 2021     HbA1c:  Lab Results   Component Value Date    HGBA1C 5.9 2021     Glucose:  Lab Results   Component Value Date    POCGLU 93 2021     Lab Results   Component Value Date     (H) 2021         Assessment and Plan    Diagnoses and all orders for this visit:    1. Other pre-existing diabetes mellitus during pregnancy in third trimester (Primary)  29.5 weeks with boy, first pregnancy, measuring a week ahead. A1c in prediabetic range at 5.9.  Fasting glucose level is within target range today at 93.  Management of gestational diabetes was discussed in detail.  Patient was provided with dietary guidelines including carbohydrate targets with meals/snacks as well as BG targets were provided: Fasting less than 95, 1 hour postprandial less than 140, 2 hours postprandial less than 120.    Potential complications related to uncontrolled gestational diabetes were also discussed including, but not limited to, LGA and macrosomia, stillbirth, polyhydramnios,  morbidity including hypoglycemia, increased risk for DM2 beyond pregnancy and risk for GDM with future pregnancies.  Discussed expected changes and potential increases in blood sugar during pregnancy.  I have asked that she monitor her BG's 4+ times per day, fasting, 1 and/or 2 hours postprandial and return in 2-4 weeks for  follow-up.  If BG's are not at target despite dietary modifications, insulin may be needed. Pt was advised to contact our office prior to follow-up if BGs are not at target.   Send BG logs weekly.  -     POC Glycosylated Hemoglobin (Hb A1C)  -     POC Glucose, Blood  -     glucose blood test strip; Use 5 times daily to monitor BG  Dispense: 300 each; Refill: 5  -     Lancets Misc. kit; Use to monitor BG up to 5 times daily  Dispense: 300 each; Refill: 5  -     glucose monitor monitoring kit; Use to monitor BG up to 5 times daily  Dispense: 1 each; Refill: 0         Return in about 3 weeks (around 1/20/2022) for next scheduled follow up, every 3 weeks until delivery. The patient was instructed to contact the clinic with any interval questions or concerns.    Katya Jaquez, DO   Endocrinologist    Please note that portions of this note were completed with a voice recognition program.

## 2021-12-30 NOTE — TELEPHONE ENCOUNTER
This medication or product is on your plan's list of covered drugs. Prior authorization is not required at this time. If your pharmacy has questions regarding the processing of your prescription, please have them call the [facility] pharmacy help desk at [phone]. **Please note: Formulary lowering, tiering exception, cost reduction and prospective Medicare hospice reviews cannot be requested using this method of submission. Please contact us at [phone] instead.  Drug  OneTouch Ultra strips  Form  [facility] Electronic Prior Authorization Form (2017 NCPDP)    CALLED PHARMACY THEY STATE ON THEIR SIDE IT SHOWS WE HAVE TO CALL THE HELP DESK FOR QUANTITY.    APPROVED THRU 12/30/2022.

## 2021-12-30 NOTE — TELEPHONE ENCOUNTER
She needs to check 4 times daily - if insurance won't cover can we try to PA and if unsuccessful she may need to purchase an additional meter/strips to make up the additional needed testing (relion at [facility] cheapest).     Thank you!

## 2021-12-30 NOTE — TELEPHONE ENCOUNTER
lmom for pt. To call office. Per pharmacy pt. Has to go thru mail order to get 150 strips per 30 days. DWIGHT

## 2021-12-30 NOTE — TELEPHONE ENCOUNTER
Spoke with pt and let her know that when the insurance denies it then we will get a denial from the pharmacy to start the PA process. Pt verbalized understanding.

## 2021-12-30 NOTE — TELEPHONE ENCOUNTER
JO [facility]. They got the PA corrected for 150 strips every 30 days PA #[id number]. JO pt. And advised her to have the pharmacy put the 50 strip rx. Back into the system and to run the 150 strip rx. And it should go through. Pt. Voiced understanding. DWIGHT

## 2021-12-30 NOTE — TELEPHONE ENCOUNTER
PT CALLED BACK TO SAY THAT SHE SPOKE TO HER INSURANCE AN IT WILL ONLY PAY FOR TESTING  1 TO 2 TIMES A DAY     PLEASE CALL PT IF YOU HAVE QUESTIONS  [phone]

## 2021-12-30 NOTE — TELEPHONE ENCOUNTER
PATIENT STATES THAT [facility] WILL BE SENDING AN AUTH FOR TEST STRIPS NEEDED IN THE FUTURE.

## 2021-12-30 NOTE — TELEPHONE ENCOUNTER
Spoke with pt again and advised her that she needs to check 4 times a day and that we sent over the rx and we are just waiting on the denial. Pt verbalized understanding.

## 2021-12-30 NOTE — TELEPHONE ENCOUNTER
PT CALLED STATING HER INSURANCE KEEPS DENYING HER TEST KIT AND TEST STRIPS RX. PLEASE LOOK INTO THIS AND REACH OUT TO PHARM. PLEASE AND THANK YOU.

## 2022-01-24 ENCOUNTER — OFFICE VISIT (OUTPATIENT)
Dept: ENDOCRINOLOGY | Facility: CLINIC | Age: 32
End: 2022-01-24

## 2022-01-24 VITALS
SYSTOLIC BLOOD PRESSURE: 116 MMHG | DIASTOLIC BLOOD PRESSURE: 73 MMHG | HEIGHT: 67 IN | OXYGEN SATURATION: 97 % | HEART RATE: 110 BPM | WEIGHT: 225 LBS | BODY MASS INDEX: 35.31 KG/M2

## 2022-01-24 DIAGNOSIS — O24.813 OTHER PRE-EXISTING DIABETES MELLITUS DURING PREGNANCY IN THIRD TRIMESTER: Primary | ICD-10-CM

## 2022-01-24 LAB
EXPIRATION DATE: NORMAL
EXPIRATION DATE: NORMAL
GLUCOSE BLDC GLUCOMTR-MCNC: 77 MG/DL (ref 70–130)
HBA1C MFR BLD: 5.2 %
Lab: NORMAL
Lab: NORMAL

## 2022-01-24 PROCEDURE — 99213 OFFICE O/P EST LOW 20 MIN: CPT | Performed by: INTERNAL MEDICINE

## 2022-01-24 PROCEDURE — 82947 ASSAY GLUCOSE BLOOD QUANT: CPT | Performed by: INTERNAL MEDICINE

## 2022-01-24 PROCEDURE — 83036 HEMOGLOBIN GLYCOSYLATED A1C: CPT | Performed by: INTERNAL MEDICINE

## 2022-01-24 RX ORDER — LANCETS 30 GAUGE
EACH MISCELLANEOUS
COMMUNITY
Start: 2022-01-22

## 2022-01-24 RX ORDER — BLOOD-GLUCOSE METER
EACH MISCELLANEOUS
COMMUNITY
Start: 2021-12-30

## 2022-01-24 NOTE — PROGRESS NOTES
"Chief Complaint   Patient presents with   • Diabetes        HPI   Kasey Colbert is a 31 y.o. female had concerns including Diabetes.    She is checking blood sugars 4 times per day.  Fasting BGs are 70s-low 90s, post meal levels are 80s-130s at 2 hrs. Only two levels in the last week above target.   She has started an exercise program after dinner and post meal levels are much improved - highest BGs now low 100s at 2 hrs.  She is 33.2 weeks with a boy and baby is measuring on schedule.    The following portions of the patient's history were reviewed and updated as appropriate: allergies, current medications, past family history, past medical history, past social history, past surgical history and problem list.    Review of Systems   Constitutional: Negative.    Endocrine: Negative.           /73   Pulse 110   Ht 170.2 cm (67\")   Wt 102 kg (225 lb)   SpO2 97%   BMI 35.24 kg/m²      Physical Exam  Vitals reviewed.   Constitutional:       Appearance: Normal appearance. She is obese.   Cardiovascular:      Rate and Rhythm: Normal rate.   Pulmonary:      Effort: Pulmonary effort is normal.   Abdominal:      Comments: Gravid   Neurological:      General: No focal deficit present.      Mental Status: She is alert. Mental status is at baseline.   Psychiatric:         Mood and Affect: Mood normal.         Behavior: Behavior normal.          HbA1c:  Lab Results   Component Value Date    HGBA1C 5.2 01/24/2022    HGBA1C 5.9 12/30/2021     Glucose:    Lab Results   Component Value Date    POCGLU 77 01/24/2022         Assessment and Plan    Diagnoses and all orders for this visit:    1. Other pre-existing diabetes mellitus during pregnancy in third trimester (Primary)  Controlled now with diet/exercise. A1c now improved to 5.2. Possible PCOS history and pt would like testing after delivery.  33.2 weeks and baby boy is measuring on schedule.   Has lost weight which is normal/expected after dietary changes. " BGs now all in target range with the addition of evening exercise.    Continue to send BG logs weekly.   -     POC Glucose, Blood  -     Cancel: POC Glycosylated Hemoglobin (Hb A1C)  -     POC Glycosylated Hemoglobin (Hb A1C)     Return in about 3 weeks (around 2/14/2022) for next scheduled follow up. The patient was instructed to contact the clinic with any interval questions or concerns.    Katya Jaquez, DO   Endocrinologist    Please note that portions of this note were completed with a voice recognition program.

## 2022-02-10 ENCOUNTER — OFFICE VISIT (OUTPATIENT)
Dept: ENDOCRINOLOGY | Facility: CLINIC | Age: 32
End: 2022-02-10

## 2022-02-10 VITALS
BODY MASS INDEX: 36.1 KG/M2 | DIASTOLIC BLOOD PRESSURE: 78 MMHG | WEIGHT: 230 LBS | HEART RATE: 81 BPM | HEIGHT: 67 IN | SYSTOLIC BLOOD PRESSURE: 118 MMHG | OXYGEN SATURATION: 99 %

## 2022-02-10 DIAGNOSIS — O24.813 OTHER PRE-EXISTING DIABETES MELLITUS DURING PREGNANCY IN THIRD TRIMESTER: Primary | ICD-10-CM

## 2022-02-10 PROBLEM — R73.03 PREDIABETES: Status: ACTIVE | Noted: 2022-02-10

## 2022-02-10 LAB
EXPIRATION DATE: NORMAL
GLUCOSE BLDC GLUCOMTR-MCNC: 97 MG/DL (ref 70–130)
Lab: NORMAL

## 2022-02-10 PROCEDURE — 99213 OFFICE O/P EST LOW 20 MIN: CPT | Performed by: INTERNAL MEDICINE

## 2022-02-10 PROCEDURE — 82947 ASSAY GLUCOSE BLOOD QUANT: CPT | Performed by: INTERNAL MEDICINE

## 2022-02-10 NOTE — PROGRESS NOTES
"Chief Complaint   Patient presents with   • Gestational Diabetes        HPI   Kasey Colbert is a 31 y.o. female had concerns including Gestational Diabetes.    She is checking blood sugars 4 times per day.  She had only one postprandial glucose that was slightly above target at 121 this week but thinks she checked it a few minutes before the 2-hour tom.  All of her fasting and her other postprandial BG's are now in target range.    Last week she had several high BG's after going off the diet due to family events.    She is 35.5 weeks with a boy and baby is measuring slightly ahead of schedule.  Was also told she has a slight increase in fluid as of the last US. Next is scheduled for Monday.     Is scheduled for induction March 8.     The following portions of the patient's history were reviewed and updated as appropriate: allergies, current medications, past family history, past medical history, past social history, past surgical history and problem list.    Review of Systems   Constitutional: Negative.    Endocrine: Negative.           /78 (BP Location: Left arm, Patient Position: Sitting, Cuff Size: Adult)   Pulse 81   Ht 170.2 cm (67\")   Wt 104 kg (230 lb)   SpO2 99%   BMI 36.02 kg/m²      Physical Exam  Vitals reviewed.   Constitutional:       Appearance: Normal appearance.   Cardiovascular:      Rate and Rhythm: Normal rate.   Pulmonary:      Effort: Pulmonary effort is normal.   Neurological:      General: No focal deficit present.      Mental Status: She is alert. Mental status is at baseline.   Psychiatric:         Mood and Affect: Mood normal.         Behavior: Behavior normal.          HbA1c:  Lab Results   Component Value Date    HGBA1C 5.2 01/24/2022    HGBA1C 5.9 12/30/2021     Glucose:    Lab Results   Component Value Date    POCGLU 97 02/10/2022         Assessment and Plan    Diagnoses and all orders for this visit:    1. Other pre-existing diabetes mellitus during pregnancy " in third trimester (Primary)  Prediabetes in pregnancy with initial A1c at 5.9.  Now controlled as long as she is following her diet consistently.  No insulin is needed.  Her glucose levels should stabilize at this time.  She is 35.5 weeks pregnant with a boy with slight LGA and polyhydramnios.  Repeat ultrasound on Monday.  Potential for induction on  though baby is breech again, may require .  Send BG logs weekly.  -     POC Glucose, Blood         Return in about 3 weeks (around 3/3/2022) for next scheduled follow up. The patient was instructed to contact the clinic with any interval questions or concerns.    Katya Jaquez, DO   Endocrinologist    Please note that portions of this note were completed with a voice recognition program.

## 2022-02-14 LAB — EXTERNAL GROUP B STREP ANTIGEN: NEGATIVE

## 2022-03-03 ENCOUNTER — OFFICE VISIT (OUTPATIENT)
Dept: ENDOCRINOLOGY | Facility: CLINIC | Age: 32
End: 2022-03-03

## 2022-03-03 VITALS
BODY MASS INDEX: 35.63 KG/M2 | HEART RATE: 90 BPM | DIASTOLIC BLOOD PRESSURE: 80 MMHG | OXYGEN SATURATION: 97 % | HEIGHT: 67 IN | SYSTOLIC BLOOD PRESSURE: 120 MMHG | WEIGHT: 227 LBS

## 2022-03-03 DIAGNOSIS — O24.813 OTHER PRE-EXISTING DIABETES MELLITUS DURING PREGNANCY IN THIRD TRIMESTER: Primary | ICD-10-CM

## 2022-03-03 PROCEDURE — 99213 OFFICE O/P EST LOW 20 MIN: CPT | Performed by: INTERNAL MEDICINE

## 2022-03-03 NOTE — PROGRESS NOTES
"Chief Complaint   Patient presents with   • Gestational Diabetes        HPI   Kasey Colbert is a 31 y.o. female had concerns including Gestational Diabetes.    She is checking blood sugars 4 times per day.  Most fasting BG is her goal.  She had one elevated BG in the last 2 weeks at 103.  Most postprandial BG's are at goal ranging 80s to 117.  She has had 2 high postprandial glucose levels last week at 159 at 1 hour after supper, yesterday 132 hours after lunch.  This tends to be when she has either not slept well or is eating out.    She is 38.5 weeks with a boy and baby is measuring slightly ahead of schedule.    Is feeling well.     Induction scheduled next Tuesday, March 8.    The following portions of the patient's history were reviewed and updated as appropriate: allergies, current medications, past family history, past medical history, past social history, past surgical history and problem list.    Review of Systems   Constitutional: Negative.    Endocrine:        See HPI          /80   Pulse 90   Ht 170.2 cm (67\")   Wt 103 kg (227 lb)   SpO2 97%   BMI 35.55 kg/m²      Physical Exam  Vitals reviewed.   Constitutional:       Appearance: Normal appearance.   Cardiovascular:      Rate and Rhythm: Normal rate.   Pulmonary:      Effort: Pulmonary effort is normal.   Abdominal:      Comments: gravid   Neurological:      General: No focal deficit present.      Mental Status: She is alert. Mental status is at baseline.   Psychiatric:         Mood and Affect: Mood normal.         Behavior: Behavior normal.          HbA1c:  Lab Results   Component Value Date    HGBA1C 5.2 01/24/2022    HGBA1C 5.9 12/30/2021     Glucose:    Lab Results   Component Value Date    POCGLU 97 02/10/2022     Assessment and Plan    Diagnoses and all orders for this visit:    1. Other pre-existing diabetes mellitus during pregnancy in third trimester (Primary)  Initial A1c consistent with prediabetes in pregnancy.  Diet " controlled.   38.5 weeks with a boy, with macrosomia.  Recent fluid levels back in a normal range.  Most glucose levels are at target.  In the last 2 weeks she has had only 3 levels out of range, 1 fasting, 2 postprandial.  No medication is needed.  Continue dietary modifications and monitoring glucose until delivery.  Once induction/labor has started or baby is delivered (by ), BG monitoring routinely is no longer needed. If checking glucose levels and she has a fasting >126 or anytime >200, call the office.   She should continue to avoid excess carbs or concentrated sweets/beverages.   Discussed the increased long-term risk to develop type 2 diabetes and potential gestational diabetes again with future pregnancies.  Advised that she lose all weight gained during pregnancy plus additional if needed.  For potential future pregnancies, start the diet advised during gestational diabetes at the first sign of pregnancy.   Follow-up in 2 to 3 months to screen for possible persistent BG issues.  Will also screen for PCOS at follow-up.     Return in about 3 months (around 6/3/2022) for next scheduled follow up. The patient was instructed to contact the clinic with any interval questions or concerns.    Katya Jaquez, DO   Endocrinologist    Please note that portions of this note were completed with a voice recognition program.

## 2022-03-06 ENCOUNTER — PREP FOR SURGERY (OUTPATIENT)
Dept: OTHER | Facility: HOSPITAL | Age: 32
End: 2022-03-06

## 2022-03-06 ENCOUNTER — CLINICAL SUPPORT NO REQUIREMENTS (OUTPATIENT)
Dept: PREADMISSION TESTING | Facility: HOSPITAL | Age: 32
End: 2022-03-06

## 2022-03-06 DIAGNOSIS — Z34.90 TERM PREGNANCY: ICD-10-CM

## 2022-03-06 DIAGNOSIS — O10.919 CHRONIC HYPERTENSION AFFECTING PREGNANCY: Primary | ICD-10-CM

## 2022-03-06 DIAGNOSIS — O24.410 GDM (GESTATIONAL DIABETES MELLITUS), CLASS A1: ICD-10-CM

## 2022-03-06 LAB — SARS-COV-2 RNA PNL SPEC NAA+PROBE: NOT DETECTED

## 2022-03-06 PROCEDURE — U0004 COV-19 TEST NON-CDC HGH THRU: HCPCS

## 2022-03-06 PROCEDURE — C9803 HOPD COVID-19 SPEC COLLECT: HCPCS

## 2022-03-06 RX ORDER — LIDOCAINE HYDROCHLORIDE 10 MG/ML
5 INJECTION, SOLUTION EPIDURAL; INFILTRATION; INTRACAUDAL; PERINEURAL AS NEEDED
Status: CANCELLED | OUTPATIENT
Start: 2022-03-06

## 2022-03-06 RX ORDER — SODIUM CHLORIDE 0.9 % (FLUSH) 0.9 %
10 SYRINGE (ML) INJECTION EVERY 12 HOURS SCHEDULED
Status: CANCELLED | OUTPATIENT
Start: 2022-03-06

## 2022-03-06 RX ORDER — OXYTOCIN/0.9 % SODIUM CHLORIDE 30/500 ML
2-24 PLASTIC BAG, INJECTION (ML) INTRAVENOUS
Status: CANCELLED | OUTPATIENT
Start: 2022-03-07

## 2022-03-06 RX ORDER — MISOPROSTOL 200 UG/1
800 TABLET ORAL AS NEEDED
Status: CANCELLED | OUTPATIENT
Start: 2022-03-06

## 2022-03-06 RX ORDER — METHYLERGONOVINE MALEATE 0.2 MG/ML
200 INJECTION INTRAVENOUS ONCE AS NEEDED
Status: CANCELLED | OUTPATIENT
Start: 2022-03-06

## 2022-03-06 RX ORDER — TERBUTALINE SULFATE 1 MG/ML
0.25 INJECTION, SOLUTION SUBCUTANEOUS AS NEEDED
Status: CANCELLED | OUTPATIENT
Start: 2022-03-06

## 2022-03-06 RX ORDER — ACETAMINOPHEN 325 MG/1
650 TABLET ORAL EVERY 4 HOURS PRN
Status: CANCELLED | OUTPATIENT
Start: 2022-03-06

## 2022-03-06 RX ORDER — MAGNESIUM CARB/ALUMINUM HYDROX 105-160MG
30 TABLET,CHEWABLE ORAL ONCE
Status: CANCELLED | OUTPATIENT
Start: 2022-03-06 | End: 2022-03-06

## 2022-03-06 RX ORDER — ONDANSETRON 2 MG/ML
4 INJECTION INTRAMUSCULAR; INTRAVENOUS EVERY 6 HOURS PRN
Status: CANCELLED | OUTPATIENT
Start: 2022-03-06

## 2022-03-06 RX ORDER — BUTORPHANOL TARTRATE 1 MG/ML
1 INJECTION, SOLUTION INTRAMUSCULAR; INTRAVENOUS
Status: CANCELLED | OUTPATIENT
Start: 2022-03-06

## 2022-03-06 RX ORDER — SODIUM CHLORIDE 0.9 % (FLUSH) 0.9 %
1-10 SYRINGE (ML) INJECTION AS NEEDED
Status: CANCELLED | OUTPATIENT
Start: 2022-03-06

## 2022-03-06 RX ORDER — ONDANSETRON 4 MG/1
4 TABLET, FILM COATED ORAL EVERY 6 HOURS PRN
Status: CANCELLED | OUTPATIENT
Start: 2022-03-06

## 2022-03-06 RX ORDER — SODIUM CHLORIDE, SODIUM LACTATE, POTASSIUM CHLORIDE, CALCIUM CHLORIDE 600; 310; 30; 20 MG/100ML; MG/100ML; MG/100ML; MG/100ML
125 INJECTION, SOLUTION INTRAVENOUS CONTINUOUS
Status: CANCELLED | OUTPATIENT
Start: 2022-03-06

## 2022-03-06 RX ORDER — OXYTOCIN/0.9 % SODIUM CHLORIDE 30/500 ML
650 PLASTIC BAG, INJECTION (ML) INTRAVENOUS ONCE
Status: CANCELLED | OUTPATIENT
Start: 2022-03-06

## 2022-03-06 RX ORDER — OXYTOCIN/0.9 % SODIUM CHLORIDE 30/500 ML
85 PLASTIC BAG, INJECTION (ML) INTRAVENOUS ONCE
Status: CANCELLED | OUTPATIENT
Start: 2022-03-06

## 2022-03-06 RX ORDER — CARBOPROST TROMETHAMINE 250 UG/ML
250 INJECTION, SOLUTION INTRAMUSCULAR AS NEEDED
Status: CANCELLED | OUTPATIENT
Start: 2022-03-06

## 2022-03-06 RX ORDER — IBUPROFEN 600 MG/1
600 TABLET ORAL EVERY 6 HOURS PRN
Status: CANCELLED | OUTPATIENT
Start: 2022-03-06

## 2022-03-08 ENCOUNTER — ANESTHESIA (OUTPATIENT)
Dept: LABOR AND DELIVERY | Facility: HOSPITAL | Age: 32
End: 2022-03-08

## 2022-03-08 ENCOUNTER — ANESTHESIA EVENT (OUTPATIENT)
Dept: LABOR AND DELIVERY | Facility: HOSPITAL | Age: 32
End: 2022-03-08

## 2022-03-08 ENCOUNTER — HOSPITAL ENCOUNTER (INPATIENT)
Facility: HOSPITAL | Age: 32
LOS: 3 days | Discharge: HOME | End: 2022-03-11
Attending: OBSTETRICS & GYNECOLOGY | Admitting: OBSTETRICS & GYNECOLOGY

## 2022-03-08 ENCOUNTER — HOSPITAL ENCOUNTER (OUTPATIENT)
Dept: LABOR AND DELIVERY | Facility: HOSPITAL | Age: 32
Discharge: HOME | End: 2022-03-08

## 2022-03-08 DIAGNOSIS — Z98.891 S/P CESAREAN SECTION: Primary | ICD-10-CM

## 2022-03-08 DIAGNOSIS — Z34.90 TERM PREGNANCY: ICD-10-CM

## 2022-03-08 PROBLEM — O24.410 GDM (GESTATIONAL DIABETES MELLITUS), CLASS A1: Status: ACTIVE | Noted: 2022-03-08

## 2022-03-08 PROBLEM — O10.919 CHRONIC HYPERTENSION DURING PREGNANCY: Status: ACTIVE | Noted: 2022-03-08

## 2022-03-08 LAB
ABO GROUP BLD: NORMAL
ALP SERPL-CCNC: 137 U/L (ref 39–117)
ALT SERPL W P-5'-P-CCNC: 18 U/L (ref 1–33)
AST SERPL-CCNC: 15 U/L (ref 1–32)
BILIRUB SERPL-MCNC: 0.2 MG/DL (ref 0–1.2)
BLD GP AB SCN SERPL QL: NEGATIVE
CREAT SERPL-MCNC: 0.7 MG/DL (ref 0.57–1)
DEPRECATED RDW RBC AUTO: 39.4 FL (ref 37–54)
ERYTHROCYTE [DISTWIDTH] IN BLOOD BY AUTOMATED COUNT: 13.4 % (ref 12.3–15.4)
GLUCOSE BLDC GLUCOMTR-MCNC: 65 MG/DL (ref 70–130)
GLUCOSE BLDC GLUCOMTR-MCNC: 70 MG/DL (ref 70–130)
GLUCOSE BLDC GLUCOMTR-MCNC: 75 MG/DL (ref 70–130)
GLUCOSE BLDC GLUCOMTR-MCNC: 85 MG/DL (ref 70–130)
HCT VFR BLD AUTO: 35.6 % (ref 34–46.6)
HGB BLD-MCNC: 12.6 G/DL (ref 12–15.9)
LDH SERPL-CCNC: 171 U/L (ref 135–214)
MCH RBC QN AUTO: 28.9 PG (ref 26.6–33)
MCHC RBC AUTO-ENTMCNC: 35.4 G/DL (ref 31.5–35.7)
MCV RBC AUTO: 81.7 FL (ref 79–97)
PLATELET # BLD AUTO: 255 10*3/MM3 (ref 140–450)
PMV BLD AUTO: 10.9 FL (ref 6–12)
RBC # BLD AUTO: 4.36 10*6/MM3 (ref 3.77–5.28)
RH BLD: POSITIVE
T&S EXPIRATION DATE: NORMAL
URATE SERPL-MCNC: 6.3 MG/DL (ref 2.4–5.7)
WBC NRBC COR # BLD: 8.17 10*3/MM3 (ref 3.4–10.8)

## 2022-03-08 PROCEDURE — 82565 ASSAY OF CREATININE: CPT | Performed by: ADVANCED PRACTICE MIDWIFE

## 2022-03-08 PROCEDURE — 25010000002 BUTORPHANOL PER 1 MG: Performed by: OBSTETRICS & GYNECOLOGY

## 2022-03-08 PROCEDURE — 25010000002 FENTANYL CITRATE (PF) 500 MCG/10ML SOLUTION

## 2022-03-08 PROCEDURE — 86901 BLOOD TYPING SEROLOGIC RH(D): CPT | Performed by: OBSTETRICS & GYNECOLOGY

## 2022-03-08 PROCEDURE — 84550 ASSAY OF BLOOD/URIC ACID: CPT | Performed by: ADVANCED PRACTICE MIDWIFE

## 2022-03-08 PROCEDURE — C1755 CATHETER, INTRASPINAL: HCPCS | Performed by: ANESTHESIOLOGY

## 2022-03-08 PROCEDURE — 84450 TRANSFERASE (AST) (SGOT): CPT | Performed by: ADVANCED PRACTICE MIDWIFE

## 2022-03-08 PROCEDURE — 84075 ASSAY ALKALINE PHOSPHATASE: CPT | Performed by: ADVANCED PRACTICE MIDWIFE

## 2022-03-08 PROCEDURE — 59200 INSERT CERVICAL DILATOR: CPT | Performed by: OBSTETRICS & GYNECOLOGY

## 2022-03-08 PROCEDURE — 82247 BILIRUBIN TOTAL: CPT | Performed by: ADVANCED PRACTICE MIDWIFE

## 2022-03-08 PROCEDURE — 86900 BLOOD TYPING SEROLOGIC ABO: CPT | Performed by: OBSTETRICS & GYNECOLOGY

## 2022-03-08 PROCEDURE — 86850 RBC ANTIBODY SCREEN: CPT | Performed by: OBSTETRICS & GYNECOLOGY

## 2022-03-08 PROCEDURE — 83615 LACTATE (LD) (LDH) ENZYME: CPT | Performed by: ADVANCED PRACTICE MIDWIFE

## 2022-03-08 PROCEDURE — 82962 GLUCOSE BLOOD TEST: CPT

## 2022-03-08 PROCEDURE — 59025 FETAL NON-STRESS TEST: CPT

## 2022-03-08 PROCEDURE — 25010000002 ROPIVACAINE PER 1 MG

## 2022-03-08 PROCEDURE — 85027 COMPLETE CBC AUTOMATED: CPT | Performed by: OBSTETRICS & GYNECOLOGY

## 2022-03-08 PROCEDURE — 84460 ALANINE AMINO (ALT) (SGPT): CPT | Performed by: ADVANCED PRACTICE MIDWIFE

## 2022-03-08 PROCEDURE — 25010000002 ROPIVACAINE PER 1 MG: Performed by: ANESTHESIOLOGY

## 2022-03-08 RX ORDER — ONDANSETRON 4 MG/1
4 TABLET, FILM COATED ORAL EVERY 6 HOURS PRN
Status: DISCONTINUED | OUTPATIENT
Start: 2022-03-08 | End: 2022-03-09

## 2022-03-08 RX ORDER — ONDANSETRON 2 MG/ML
4 INJECTION INTRAMUSCULAR; INTRAVENOUS ONCE AS NEEDED
Status: DISCONTINUED | OUTPATIENT
Start: 2022-03-08 | End: 2022-03-08 | Stop reason: SDUPTHER

## 2022-03-08 RX ORDER — SODIUM CHLORIDE 0.9 % (FLUSH) 0.9 %
10 SYRINGE (ML) INJECTION EVERY 12 HOURS SCHEDULED
Status: DISCONTINUED | OUTPATIENT
Start: 2022-03-08 | End: 2022-03-09

## 2022-03-08 RX ORDER — SODIUM CHLORIDE, SODIUM LACTATE, POTASSIUM CHLORIDE, CALCIUM CHLORIDE 600; 310; 30; 20 MG/100ML; MG/100ML; MG/100ML; MG/100ML
125 INJECTION, SOLUTION INTRAVENOUS CONTINUOUS
Status: DISCONTINUED | OUTPATIENT
Start: 2022-03-08 | End: 2022-03-09

## 2022-03-08 RX ORDER — ONDANSETRON 2 MG/ML
4 INJECTION INTRAMUSCULAR; INTRAVENOUS EVERY 6 HOURS PRN
Status: DISCONTINUED | OUTPATIENT
Start: 2022-03-08 | End: 2022-03-08 | Stop reason: SDUPTHER

## 2022-03-08 RX ORDER — ROPIVACAINE HYDROCHLORIDE 5 MG/ML
INJECTION, SOLUTION EPIDURAL; INFILTRATION; PERINEURAL AS NEEDED
Status: DISCONTINUED | OUTPATIENT
Start: 2022-03-08 | End: 2022-03-09 | Stop reason: SURG

## 2022-03-08 RX ORDER — LABETALOL HYDROCHLORIDE 5 MG/ML
20-80 INJECTION, SOLUTION INTRAVENOUS
Status: DISCONTINUED | OUTPATIENT
Start: 2022-03-08 | End: 2022-03-09

## 2022-03-08 RX ORDER — LABETALOL 200 MG/1
200 TABLET, FILM COATED ORAL 2 TIMES DAILY
Status: DISCONTINUED | OUTPATIENT
Start: 2022-03-08 | End: 2022-03-11 | Stop reason: HOSPADM

## 2022-03-08 RX ORDER — METOCLOPRAMIDE HYDROCHLORIDE 5 MG/ML
10 INJECTION INTRAMUSCULAR; INTRAVENOUS ONCE AS NEEDED
Status: DISCONTINUED | OUTPATIENT
Start: 2022-03-08 | End: 2022-03-09

## 2022-03-08 RX ORDER — BUTORPHANOL TARTRATE 1 MG/ML
1 INJECTION, SOLUTION INTRAMUSCULAR; INTRAVENOUS
Status: DISCONTINUED | OUTPATIENT
Start: 2022-03-08 | End: 2022-03-09

## 2022-03-08 RX ORDER — LIDOCAINE HYDROCHLORIDE 10 MG/ML
5 INJECTION, SOLUTION EPIDURAL; INFILTRATION; INTRACAUDAL; PERINEURAL AS NEEDED
Status: DISCONTINUED | OUTPATIENT
Start: 2022-03-08 | End: 2022-03-09

## 2022-03-08 RX ORDER — METOCLOPRAMIDE HYDROCHLORIDE 5 MG/ML
10 INJECTION INTRAMUSCULAR; INTRAVENOUS ONCE AS NEEDED
Status: DISCONTINUED | OUTPATIENT
Start: 2022-03-08 | End: 2022-03-08 | Stop reason: SDUPTHER

## 2022-03-08 RX ORDER — FAMOTIDINE 10 MG/ML
20 INJECTION, SOLUTION INTRAVENOUS ONCE AS NEEDED
Status: DISCONTINUED | OUTPATIENT
Start: 2022-03-08 | End: 2022-03-08 | Stop reason: SDUPTHER

## 2022-03-08 RX ORDER — ERYTHROMYCIN 5 MG/G
OINTMENT OPHTHALMIC
Status: DISCONTINUED
Start: 2022-03-08 | End: 2022-03-11 | Stop reason: HOSPADM

## 2022-03-08 RX ORDER — MAGNESIUM CARB/ALUMINUM HYDROX 105-160MG
30 TABLET,CHEWABLE ORAL ONCE
Status: DISCONTINUED | OUTPATIENT
Start: 2022-03-08 | End: 2022-03-09

## 2022-03-08 RX ORDER — EPHEDRINE SULFATE 5 MG/ML
INJECTION INTRAVENOUS
Status: DISCONTINUED
Start: 2022-03-08 | End: 2022-03-09 | Stop reason: WASHOUT

## 2022-03-08 RX ORDER — FAMOTIDINE 10 MG/ML
20 INJECTION, SOLUTION INTRAVENOUS ONCE AS NEEDED
Status: DISCONTINUED | OUTPATIENT
Start: 2022-03-08 | End: 2022-03-09

## 2022-03-08 RX ORDER — LIDOCAINE HYDROCHLORIDE AND EPINEPHRINE 15; 5 MG/ML; UG/ML
INJECTION, SOLUTION EPIDURAL AS NEEDED
Status: DISCONTINUED | OUTPATIENT
Start: 2022-03-08 | End: 2022-03-09 | Stop reason: SURG

## 2022-03-08 RX ORDER — OXYTOCIN/0.9 % SODIUM CHLORIDE 30/500 ML
2-24 PLASTIC BAG, INJECTION (ML) INTRAVENOUS
Status: DISCONTINUED | OUTPATIENT
Start: 2022-03-09 | End: 2022-03-08

## 2022-03-08 RX ORDER — OXYTOCIN/0.9 % SODIUM CHLORIDE 30/500 ML
2-24 PLASTIC BAG, INJECTION (ML) INTRAVENOUS
Status: DISCONTINUED | OUTPATIENT
Start: 2022-03-08 | End: 2022-03-09

## 2022-03-08 RX ORDER — ONDANSETRON 2 MG/ML
4 INJECTION INTRAMUSCULAR; INTRAVENOUS EVERY 6 HOURS PRN
Status: DISCONTINUED | OUTPATIENT
Start: 2022-03-08 | End: 2022-03-09

## 2022-03-08 RX ORDER — DIPHENHYDRAMINE HYDROCHLORIDE 50 MG/ML
12.5 INJECTION INTRAMUSCULAR; INTRAVENOUS EVERY 8 HOURS PRN
Status: DISCONTINUED | OUTPATIENT
Start: 2022-03-08 | End: 2022-03-08 | Stop reason: SDUPTHER

## 2022-03-08 RX ORDER — ACETAMINOPHEN 325 MG/1
650 TABLET ORAL EVERY 4 HOURS PRN
Status: DISCONTINUED | OUTPATIENT
Start: 2022-03-08 | End: 2022-03-09

## 2022-03-08 RX ORDER — TRISODIUM CITRATE DIHYDRATE AND CITRIC ACID MONOHYDRATE 500; 334 MG/5ML; MG/5ML
30 SOLUTION ORAL ONCE
Status: DISCONTINUED | OUTPATIENT
Start: 2022-03-08 | End: 2022-03-08 | Stop reason: SDUPTHER

## 2022-03-08 RX ORDER — OXYTOCIN/0.9 % SODIUM CHLORIDE 30/500 ML
PLASTIC BAG, INJECTION (ML) INTRAVENOUS
Status: DISCONTINUED
Start: 2022-03-08 | End: 2022-03-11 | Stop reason: HOSPADM

## 2022-03-08 RX ORDER — DIPHENHYDRAMINE HYDROCHLORIDE 50 MG/ML
12.5 INJECTION INTRAMUSCULAR; INTRAVENOUS EVERY 8 HOURS PRN
Status: DISCONTINUED | OUTPATIENT
Start: 2022-03-08 | End: 2022-03-09

## 2022-03-08 RX ORDER — EPHEDRINE SULFATE 5 MG/ML
10 INJECTION INTRAVENOUS
Status: DISCONTINUED | OUTPATIENT
Start: 2022-03-08 | End: 2022-03-08 | Stop reason: SDUPTHER

## 2022-03-08 RX ORDER — SODIUM CHLORIDE 0.9 % (FLUSH) 0.9 %
1-10 SYRINGE (ML) INJECTION AS NEEDED
Status: DISCONTINUED | OUTPATIENT
Start: 2022-03-08 | End: 2022-03-09

## 2022-03-08 RX ORDER — EPHEDRINE SULFATE 5 MG/ML
10 INJECTION INTRAVENOUS
Status: DISCONTINUED | OUTPATIENT
Start: 2022-03-08 | End: 2022-03-09

## 2022-03-08 RX ORDER — TERBUTALINE SULFATE 1 MG/ML
0.25 INJECTION, SOLUTION SUBCUTANEOUS AS NEEDED
Status: DISCONTINUED | OUTPATIENT
Start: 2022-03-08 | End: 2022-03-09

## 2022-03-08 RX ORDER — TRISODIUM CITRATE DIHYDRATE AND CITRIC ACID MONOHYDRATE 500; 334 MG/5ML; MG/5ML
30 SOLUTION ORAL ONCE
Status: COMPLETED | OUTPATIENT
Start: 2022-03-08 | End: 2022-03-09

## 2022-03-08 RX ADMIN — SODIUM CHLORIDE, POTASSIUM CHLORIDE, SODIUM LACTATE AND CALCIUM CHLORIDE 125 ML/HR: 600; 310; 30; 20 INJECTION, SOLUTION INTRAVENOUS at 16:48

## 2022-03-08 RX ADMIN — BUTORPHANOL TARTRATE 1 MG: 1 INJECTION, SOLUTION INTRAMUSCULAR; INTRAVENOUS at 10:21

## 2022-03-08 RX ADMIN — Medication 2 MILLI-UNITS/MIN: at 09:39

## 2022-03-08 RX ADMIN — ROPIVACAINE HYDROCHLORIDE 6.5 ML: 5 INJECTION, SOLUTION EPIDURAL; INFILTRATION; PERINEURAL at 23:11

## 2022-03-08 RX ADMIN — LIDOCAINE HYDROCHLORIDE AND EPINEPHRINE 3 ML: 15; 5 INJECTION, SOLUTION EPIDURAL at 23:11

## 2022-03-08 RX ADMIN — SODIUM CHLORIDE, POTASSIUM CHLORIDE, SODIUM LACTATE AND CALCIUM CHLORIDE 125 ML/HR: 600; 310; 30; 20 INJECTION, SOLUTION INTRAVENOUS at 08:36

## 2022-03-08 RX ADMIN — LABETALOL HYDROCHLORIDE 200 MG: 200 TABLET, FILM COATED ORAL at 15:53

## 2022-03-08 RX ADMIN — BUTORPHANOL TARTRATE 2 MG: 2 INJECTION, SOLUTION INTRAMUSCULAR; INTRAVENOUS at 12:48

## 2022-03-08 NOTE — H&P
Jaciel  Obstetric History and Physical    Chief Complaint   Patient presents with   • Rupture of Membranes       Subjective     Patient is a 31 y.o. female  currently at 39w3d, who presents with reports of SROM @ 0730, fluid clear.  Baby active. Not feeling contractions. She has chronic HTN and GDM A1.  She hasn't checked BS this morning or taken any antihypertensive medications.    Her prenatal care is complicated by  hypertension  chronic hypertension and diabetes  GDM A1.  Her previous obstetric/gynecological history is noted for infertility.    The following portions of the patients history were reviewed and updated as appropriate: current medications, allergies, past medical history, past surgical history, past family history, past social history and problem list .       Prenatal Information:           External Prenatal Results     Pregnancy Outside Results - Transcribed From Office Records - See Scanned Records For Details     Test Value Date Time    ABO  A  21    Rh  Positive  21    Antibody Screen  Negative  21 0945       Negative  21 193       Negative  21 1201    Varicella IgG       Rubella  6.47 index 21 1201    Hgb  12.6 g/dL 22 0848       9.8 g/dL 21 0945       10.6 g/dL 21       12.2 g/dL 21 1201    Hct  35.6 % 22 0848       29.0 % 21 0945       31.4 % 21       36.1 % 21 1201    Glucose Fasting GTT       Glucose Tolerance Test 1 hour       Glucose Tolerance Test 3 hour       Gonorrhea (discrete)       Chlamydia (discrete)       RPR  Non-Reactive  21 1201    VDRL       Syphilis Antibody       HBsAg  Non-Reactive  21 1201    Herpes Simplex Virus PCR       Herpes Simplex VIrus Culture       HIV  Non-Reactive  21 1201    Hep C RNA Quant PCR       Hep C Antibody  Non-Reactive  21 1201    AFP       Group B Strep       GBS Susceptibility to Clindamycin       GBS  Susceptibility to Erythromycin       Fetal Fibronectin       Genetic Testing, Maternal Blood             Drug Screening     Test Value Date Time    Urine Drug Screen       Amphetamine Screen  Negative  21 1201    Barbiturate Screen  Negative  21 1201    Benzodiazepine Screen  Negative  21 1201    Methadone Screen  Negative  21 1201    Phencyclidine Screen  Negative  21 1201    Opiates Screen  Negative  21 1201    THC Screen  Negative  21 1201    Cocaine Screen       Propoxyphene Screen  Negative  21 1201    Buprenorphine Screen  Negative  21 1201    Methamphetamine Screen       Oxycodone Screen  Negative  21 1201    Tricyclic Antidepressants Screen  Negative  21 1201          Legend    ^: Historical                          Past OB History:       OB History    Para Term  AB Living   2 0 0 0 1 0   SAB IAB Ectopic Molar Multiple Live Births   0 0 0 0 0 0      # Outcome Date GA Lbr Rakan/2nd Weight Sex Delivery Anes PTL Lv   2 Current            1 AB  5w0d    Spon misscar          Past Medical History: Past Medical History:   Diagnosis Date   • Acid reflux    • Amenorrhea 01/01/2014    X2 MO   • Anxiety    • Endometriosis     STAGE 4  5/27/15  ASHMUN   • Gestational diabetes    • Heart murmur    • Hx of colonoscopy 2016    CLEAR   • Hypertension    • Hypertension    • Ovarian cyst 2014    12/14 X 3 ON R OVARY SEE US REPORT   • Papanicolaou smear 2018    NEG   • Pelvic pain    • Screening breast examination     ADMITS   • Vitamin D deficiency 2021      Past Surgical History Past Surgical History:   Procedure Laterality Date   • COLONOSCOPY  2016    NORMAL   • DIAGNOSTIC LAPAROSCOPY  2015   • HX OVARIAN CYSTECTOMY Bilateral 2015   • WISDOM TOOTH EXTRACTION        Family History: Family History   Problem Relation Age of Onset   • Other Mother         ABNORMAL PAP SMEAR   • Hypertension Mother    •  Hypertension Father    • Other Paternal Grandmother         HYSTERECTOMY AGE 40 GYN DISORDER   • Heart disease Paternal Grandmother    • Osteoporosis Paternal Grandmother    • Colon cancer Paternal Grandfather       Social History:  reports that she has never smoked. She has never used smokeless tobacco.   reports no history of alcohol use.   reports no history of drug use.        General ROS: Pertinent items are noted in HPI, all other systems reviewed and negative    Objective     Vitals:    03/08/22 1413   BP: 156/92   Pulse: 79   Resp: 18   Temp: 98 °F (36.7 °C)     Weight: Weight:  [99.8 kg (220 lb)] 99.8 kg (220 lb)     Physical Examination:   General Appearance: alert, well appearing, in no apparent distress  Lungs: clear to auscultation, no wheezes, rales or rhonchi, symmetric air entry  Heart: regular rate and rhythm, no murmurs  Abdomen: Gravid uterus,soft between contractions, +FM  Back exam: no CVA tenderness   Extremities: no redness or tenderness in the calves or thighs, no edema  Skin: normal coloration and turgor, no rashes      Presentation: Vertex   Cervix: Exam by: Method: sterile exam per RN   Dilation: Cervical Dilation (cm): 2   Effacement: Cervical Effacement: 50%   Station:  Membranes: Fetal Station: -2   Leaking clear fluid        Fetal Heart Rate Assessment   Method: Fetal HR Assessment Method: external   Beats/min: Fetal HR (beats/min): 120   Baseline: Fetal HR Baseline: normal range   Varibility: Fetal HR Variability: moderate (amplitude range 6 to 25 bpm)   Accels: Fetal HR Accelerations: greater than/equal to 15 bpm, lasting at least 15 seconds   Decels: Fetal HR Decelerations: absent   Tracing Category:       Uterine Assessment   Method: Method: external tocotransducer   Frequency (min): Contraction Frequency (Minutes): x2   Ctx Count in 10 min: Contractions in 10 Minutes: x2   Duration:     Intensity: Contraction Intensity: no contractions   Intensity by IUPC:     Resting Tone:      Resting Tone by IUPC:     Andre Units:       Laboratory Results: Labs reviewed      Term pregnancy    GDM (gestational diabetes mellitus), class A1    Chronic hypertension during pregnancy        Assessment:  1.  Intrauterine pregnancy at 39w3d weeks gestation with reactive, reassuring fetal status.    2.  labor  with ROM  3. GBS status:Negative    Plan:  1. fetal and uterine monitoring  continuously, cervical ripening with  low dose Pitocin and intra-uterine cunningham catheter, labor augmentation  Pitocin and analgesia with  parenteral narcotics and epidural. FSBS Q 4 hours  2. Plan of care has been reviewed with patient and   3.  Risks, benefits of treatment plan have been discussed.  4.  All questions have been answered.        Dilma Minaya CNM  3/8/2022  14:16 EST

## 2022-03-08 NOTE — PROGRESS NOTES
31 y.o.  at 39w 3d  OB History        2    Para        Term                AB   1    Living           SAB        IAB        Ectopic        Molar        Multiple        Live Births                 Presents as an induction of labor due to PROM, unfavorable cervix    Her primary OB requests a Vazquez Bulb placement to initiate the induction of labor.    Fetal Heart Rate Assessment   Method:     Beats/min:     Baseline:     Varibility:     Accels:     Decels:     Tracing Category:       TOCO  SVE    A Vazquez Bulb was placed without difficulties with 60 mL of sterile saline.  The patient tolerated the procedure well.

## 2022-03-08 NOTE — PROGRESS NOTES
"03/08/22  17:47 EST  Kasey Luana Spegal      ASSESSMENTS: Feeling some contractions.  Baby active. Vazquez bulb not out yet    /81   Pulse 82   Temp 98.7 °F (37.1 °C) (Oral)   Resp 18   Ht 170.2 cm (67\")   Wt 99.8 kg (220 lb)   Breastfeeding Yes   BMI 34.46 kg/m²     Fetal Heart Rate Assessment   Method: Fetal HR Assessment Method: external   Beats/min: Fetal HR (beats/min): 125   Baseline: Fetal HR Baseline: normal range   Varibility: Fetal HR Variability: moderate (amplitude range 6 to 25 bpm)   Accels: Fetal HR Accelerations: lasting at least 15 seconds, greater than/equal to 15 bpm   Decels: Fetal HR Decelerations: absent   Tracing Category:       Uterine Assessment   Method: Method: external tocotransducer   Frequency (min): Contraction Frequency (Minutes): 3-4   Ctx Count in 10 min:     Duration:     Intensity: Contraction Intensity: no contractions   Intensity by IUPC:     Resting Tone:     Resting Tone by IUPC:          Presentation: Vertex   Cervix: Exam by: Method: sterile exam per ILDA Minaya CNM   Dilation: Cervical Dilation (cm): 2   Effacement: Cervical Effacement: 50%   Station: Fetal Station: -2            Lab Results   Component Value Date    WBC 8.17 03/08/2022    HGB 12.6 03/08/2022    HCT 35.6 03/08/2022    MCV 81.7 03/08/2022     03/08/2022    URICACID 6.3 (H) 03/08/2022    AST 15 03/08/2022    ALT 18 03/08/2022     03/08/2022     Results from last 7 days   Lab Units 03/08/22  0856   ABO TYPING  A   RH TYPING  Positive   ANTIBODY SCREEN  Negative       PLAN: Continue with present management.  Epidural PRN when cervical dilator expelled    Dilma Minaya CNM  17:47 EST  03/08/22  "

## 2022-03-09 PROBLEM — Z34.90 TERM PREGNANCY: Status: RESOLVED | Noted: 2022-03-08 | Resolved: 2022-03-09

## 2022-03-09 PROBLEM — O11.9 CHRONIC HYPERTENSION WITH SUPERIMPOSED PREECLAMPSIA: Status: ACTIVE | Noted: 2022-03-09

## 2022-03-09 LAB
ALP SERPL-CCNC: 94 U/L (ref 39–117)
ALT SERPL W P-5'-P-CCNC: 15 U/L (ref 1–33)
AST SERPL-CCNC: 33 U/L (ref 1–32)
BILIRUB SERPL-MCNC: 0.7 MG/DL (ref 0–1.2)
CREAT SERPL-MCNC: 0.68 MG/DL (ref 0.57–1)
DEPRECATED RDW RBC AUTO: 40.5 FL (ref 37–54)
DEPRECATED RDW RBC AUTO: 40.6 FL (ref 37–54)
ERYTHROCYTE [DISTWIDTH] IN BLOOD BY AUTOMATED COUNT: 13.3 % (ref 12.3–15.4)
ERYTHROCYTE [DISTWIDTH] IN BLOOD BY AUTOMATED COUNT: 13.4 % (ref 12.3–15.4)
GLUCOSE BLDC GLUCOMTR-MCNC: 69 MG/DL (ref 70–130)
GLUCOSE BLDC GLUCOMTR-MCNC: 79 MG/DL (ref 70–130)
GLUCOSE BLDC GLUCOMTR-MCNC: 80 MG/DL (ref 70–130)
GLUCOSE BLDC GLUCOMTR-MCNC: 80 MG/DL (ref 70–130)
GLUCOSE BLDC GLUCOMTR-MCNC: 84 MG/DL (ref 70–130)
GLUCOSE BLDC GLUCOMTR-MCNC: 88 MG/DL (ref 70–130)
HCT VFR BLD AUTO: 25.8 % (ref 34–46.6)
HCT VFR BLD AUTO: 26.7 % (ref 34–46.6)
HGB BLD-MCNC: 8.7 G/DL (ref 12–15.9)
HGB BLD-MCNC: 9.2 G/DL (ref 12–15.9)
LDH SERPL-CCNC: 328 U/L (ref 135–214)
MCH RBC QN AUTO: 28.3 PG (ref 26.6–33)
MCH RBC QN AUTO: 28.8 PG (ref 26.6–33)
MCHC RBC AUTO-ENTMCNC: 33.7 G/DL (ref 31.5–35.7)
MCHC RBC AUTO-ENTMCNC: 34.5 G/DL (ref 31.5–35.7)
MCV RBC AUTO: 83.7 FL (ref 79–97)
MCV RBC AUTO: 84 FL (ref 79–97)
PLATELET # BLD AUTO: 185 10*3/MM3 (ref 140–450)
PLATELET # BLD AUTO: 190 10*3/MM3 (ref 140–450)
PMV BLD AUTO: 11 FL (ref 6–12)
PMV BLD AUTO: 11.1 FL (ref 6–12)
RBC # BLD AUTO: 3.07 10*6/MM3 (ref 3.77–5.28)
RBC # BLD AUTO: 3.19 10*6/MM3 (ref 3.77–5.28)
URATE SERPL-MCNC: 5.4 MG/DL (ref 2.4–5.7)
WBC NRBC COR # BLD: 12.38 10*3/MM3 (ref 3.4–10.8)
WBC NRBC COR # BLD: 13.4 10*3/MM3 (ref 3.4–10.8)

## 2022-03-09 PROCEDURE — 85027 COMPLETE CBC AUTOMATED: CPT | Performed by: OBSTETRICS & GYNECOLOGY

## 2022-03-09 PROCEDURE — 51703 INSERT BLADDER CATH COMPLEX: CPT

## 2022-03-09 PROCEDURE — 25010000002 AZITHROMYCIN PER 500 MG

## 2022-03-09 PROCEDURE — 25010000002 ROPIVACAINE PER 1 MG

## 2022-03-09 PROCEDURE — 84075 ASSAY ALKALINE PHOSPHATASE: CPT | Performed by: OBSTETRICS & GYNECOLOGY

## 2022-03-09 PROCEDURE — 84450 TRANSFERASE (AST) (SGOT): CPT | Performed by: OBSTETRICS & GYNECOLOGY

## 2022-03-09 PROCEDURE — 25010000002 ONDANSETRON PER 1 MG: Performed by: NURSE ANESTHETIST, CERTIFIED REGISTERED

## 2022-03-09 PROCEDURE — 0 MAGNESIUM SULFATE 20 GM/500ML SOLUTION: Performed by: OBSTETRICS & GYNECOLOGY

## 2022-03-09 PROCEDURE — 25010000002 METOCLOPRAMIDE PER 10 MG: Performed by: NURSE ANESTHETIST, CERTIFIED REGISTERED

## 2022-03-09 PROCEDURE — C1889 IMPLANT/INSERT DEVICE, NOC: HCPCS | Performed by: OBSTETRICS & GYNECOLOGY

## 2022-03-09 PROCEDURE — C1755 CATHETER, INTRASPINAL: HCPCS

## 2022-03-09 PROCEDURE — 84460 ALANINE AMINO (ALT) (SGPT): CPT | Performed by: OBSTETRICS & GYNECOLOGY

## 2022-03-09 PROCEDURE — 82962 GLUCOSE BLOOD TEST: CPT

## 2022-03-09 PROCEDURE — 0 MORPHINE PER 10 MG: Performed by: NURSE ANESTHETIST, CERTIFIED REGISTERED

## 2022-03-09 PROCEDURE — 25010000002 MIDAZOLAM PER 1 MG: Performed by: NURSE ANESTHETIST, CERTIFIED REGISTERED

## 2022-03-09 PROCEDURE — 25010000002 ONDANSETRON PER 1 MG: Performed by: OBSTETRICS & GYNECOLOGY

## 2022-03-09 PROCEDURE — 83615 LACTATE (LD) (LDH) ENZYME: CPT | Performed by: OBSTETRICS & GYNECOLOGY

## 2022-03-09 PROCEDURE — 82565 ASSAY OF CREATININE: CPT | Performed by: OBSTETRICS & GYNECOLOGY

## 2022-03-09 PROCEDURE — 25010000002 FENTANYL CITRATE (PF) 50 MCG/ML SOLUTION: Performed by: NURSE ANESTHETIST, CERTIFIED REGISTERED

## 2022-03-09 PROCEDURE — 84550 ASSAY OF BLOOD/URIC ACID: CPT | Performed by: OBSTETRICS & GYNECOLOGY

## 2022-03-09 PROCEDURE — 0W3N0ZZ CONTROL BLEEDING IN FEMALE PERINEUM, OPEN APPROACH: ICD-10-PCS | Performed by: OBSTETRICS & GYNECOLOGY

## 2022-03-09 PROCEDURE — 25010000002 ROPIVACAINE PER 1 MG: Performed by: ANESTHESIOLOGY

## 2022-03-09 PROCEDURE — 25010000002 HYDRALAZINE PER 20 MG: Performed by: OBSTETRICS & GYNECOLOGY

## 2022-03-09 PROCEDURE — 59025 FETAL NON-STRESS TEST: CPT

## 2022-03-09 PROCEDURE — 25010000002 FENTANYL CITRATE (PF) 500 MCG/10ML SOLUTION

## 2022-03-09 PROCEDURE — 82247 BILIRUBIN TOTAL: CPT | Performed by: OBSTETRICS & GYNECOLOGY

## 2022-03-09 PROCEDURE — 25010000002 CEFAZOLIN IN DEXTROSE 2-4 GM/100ML-% SOLUTION: Performed by: OBSTETRICS & GYNECOLOGY

## 2022-03-09 DEVICE — HEMOST ABS SURGICEL PWDR 3GM: Type: IMPLANTABLE DEVICE | Site: UTERUS | Status: FUNCTIONAL

## 2022-03-09 RX ORDER — HYDRALAZINE HYDROCHLORIDE 20 MG/ML
5 INJECTION INTRAMUSCULAR; INTRAVENOUS ONCE
Status: COMPLETED | OUTPATIENT
Start: 2022-03-09 | End: 2022-03-09

## 2022-03-09 RX ORDER — FAMOTIDINE 10 MG/ML
INJECTION, SOLUTION INTRAVENOUS AS NEEDED
Status: DISCONTINUED | OUTPATIENT
Start: 2022-03-09 | End: 2022-03-09 | Stop reason: SURG

## 2022-03-09 RX ORDER — ONDANSETRON 2 MG/ML
INJECTION INTRAMUSCULAR; INTRAVENOUS AS NEEDED
Status: DISCONTINUED | OUTPATIENT
Start: 2022-03-09 | End: 2022-03-09 | Stop reason: SURG

## 2022-03-09 RX ORDER — ONDANSETRON 2 MG/ML
4 INJECTION INTRAMUSCULAR; INTRAVENOUS EVERY 6 HOURS PRN
Status: DISCONTINUED | OUTPATIENT
Start: 2022-03-09 | End: 2022-03-11 | Stop reason: HOSPADM

## 2022-03-09 RX ORDER — OXYTOCIN/0.9 % SODIUM CHLORIDE 30/500 ML
PLASTIC BAG, INJECTION (ML) INTRAVENOUS CONTINUOUS PRN
Status: DISCONTINUED | OUTPATIENT
Start: 2022-03-09 | End: 2022-03-09 | Stop reason: SURG

## 2022-03-09 RX ORDER — LIDOCAINE HYDROCHLORIDE AND EPINEPHRINE 20; 5 MG/ML; UG/ML
INJECTION, SOLUTION EPIDURAL; INFILTRATION; INTRACAUDAL; PERINEURAL AS NEEDED
Status: DISCONTINUED | OUTPATIENT
Start: 2022-03-09 | End: 2022-03-09 | Stop reason: SURG

## 2022-03-09 RX ORDER — IBUPROFEN 600 MG/1
600 TABLET ORAL EVERY 6 HOURS SCHEDULED
Status: DISCONTINUED | OUTPATIENT
Start: 2022-03-09 | End: 2022-03-11 | Stop reason: HOSPADM

## 2022-03-09 RX ORDER — ACETAMINOPHEN 325 MG/1
650 TABLET ORAL EVERY 6 HOURS SCHEDULED
Status: DISCONTINUED | OUTPATIENT
Start: 2022-03-09 | End: 2022-03-11 | Stop reason: HOSPADM

## 2022-03-09 RX ORDER — FERROUS SULFATE 325(65) MG
325 TABLET ORAL 2 TIMES DAILY WITH MEALS
Status: DISCONTINUED | OUTPATIENT
Start: 2022-03-09 | End: 2022-03-11 | Stop reason: HOSPADM

## 2022-03-09 RX ORDER — METOCLOPRAMIDE HYDROCHLORIDE 5 MG/ML
INJECTION INTRAMUSCULAR; INTRAVENOUS AS NEEDED
Status: DISCONTINUED | OUTPATIENT
Start: 2022-03-09 | End: 2022-03-09 | Stop reason: SURG

## 2022-03-09 RX ORDER — OXYTOCIN/0.9 % SODIUM CHLORIDE 30/500 ML
650 PLASTIC BAG, INJECTION (ML) INTRAVENOUS ONCE
Status: DISCONTINUED | OUTPATIENT
Start: 2022-03-09 | End: 2022-03-09

## 2022-03-09 RX ORDER — CARBOPROST TROMETHAMINE 250 UG/ML
INJECTION, SOLUTION INTRAMUSCULAR AS NEEDED
Status: DISCONTINUED | OUTPATIENT
Start: 2022-03-09 | End: 2022-03-09 | Stop reason: SURG

## 2022-03-09 RX ORDER — EPHEDRINE SULFATE 50 MG/ML
INJECTION, SOLUTION INTRAVENOUS AS NEEDED
Status: DISCONTINUED | OUTPATIENT
Start: 2022-03-09 | End: 2022-03-09 | Stop reason: SURG

## 2022-03-09 RX ORDER — MORPHINE SULFATE 0.5 MG/ML
INJECTION, SOLUTION EPIDURAL; INTRATHECAL; INTRAVENOUS AS NEEDED
Status: DISCONTINUED | OUTPATIENT
Start: 2022-03-09 | End: 2022-03-09 | Stop reason: SURG

## 2022-03-09 RX ORDER — CARBOPROST TROMETHAMINE 250 UG/ML
250 INJECTION, SOLUTION INTRAMUSCULAR AS NEEDED
Status: DISCONTINUED | OUTPATIENT
Start: 2022-03-09 | End: 2022-03-11 | Stop reason: HOSPADM

## 2022-03-09 RX ORDER — CEFAZOLIN SODIUM 2 G/100ML
2 INJECTION, SOLUTION INTRAVENOUS ONCE
Status: COMPLETED | OUTPATIENT
Start: 2022-03-09 | End: 2022-03-09

## 2022-03-09 RX ORDER — BUPIVACAINE HYDROCHLORIDE 5 MG/ML
INJECTION, SOLUTION EPIDURAL; INTRACAUDAL AS NEEDED
Status: DISCONTINUED | OUTPATIENT
Start: 2022-03-09 | End: 2022-03-09 | Stop reason: SURG

## 2022-03-09 RX ORDER — MAGNESIUM SULFATE HEPTAHYDRATE 40 MG/ML
2 INJECTION, SOLUTION INTRAVENOUS CONTINUOUS
Status: DISCONTINUED | OUTPATIENT
Start: 2022-03-09 | End: 2022-03-11 | Stop reason: HOSPADM

## 2022-03-09 RX ORDER — IBUPROFEN 600 MG/1
600 TABLET ORAL EVERY 6 HOURS PRN
Status: DISCONTINUED | OUTPATIENT
Start: 2022-03-09 | End: 2022-03-09 | Stop reason: SDUPTHER

## 2022-03-09 RX ORDER — OXYCODONE HYDROCHLORIDE 5 MG/1
5 TABLET ORAL EVERY 4 HOURS PRN
Status: DISCONTINUED | OUTPATIENT
Start: 2022-03-09 | End: 2022-03-11 | Stop reason: HOSPADM

## 2022-03-09 RX ORDER — METHYLERGONOVINE MALEATE 0.2 MG/ML
200 INJECTION INTRAVENOUS ONCE AS NEEDED
Status: DISCONTINUED | OUTPATIENT
Start: 2022-03-09 | End: 2022-03-11 | Stop reason: HOSPADM

## 2022-03-09 RX ORDER — MIDAZOLAM HYDROCHLORIDE 1 MG/ML
INJECTION INTRAMUSCULAR; INTRAVENOUS AS NEEDED
Status: DISCONTINUED | OUTPATIENT
Start: 2022-03-09 | End: 2022-03-09 | Stop reason: SURG

## 2022-03-09 RX ORDER — OXYTOCIN/0.9 % SODIUM CHLORIDE 30/500 ML
85 PLASTIC BAG, INJECTION (ML) INTRAVENOUS ONCE
Status: DISCONTINUED | OUTPATIENT
Start: 2022-03-09 | End: 2022-03-09

## 2022-03-09 RX ORDER — CARBOPROST TROMETHAMINE 250 UG/ML
INJECTION, SOLUTION INTRAMUSCULAR
Status: COMPLETED
Start: 2022-03-09 | End: 2022-03-09

## 2022-03-09 RX ORDER — FENTANYL CITRATE 50 UG/ML
INJECTION, SOLUTION INTRAMUSCULAR; INTRAVENOUS AS NEEDED
Status: DISCONTINUED | OUTPATIENT
Start: 2022-03-09 | End: 2022-03-09 | Stop reason: SURG

## 2022-03-09 RX ORDER — MISOPROSTOL 200 UG/1
800 TABLET ORAL AS NEEDED
Status: DISCONTINUED | OUTPATIENT
Start: 2022-03-09 | End: 2022-03-11 | Stop reason: HOSPADM

## 2022-03-09 RX ORDER — SODIUM CHLORIDE, SODIUM LACTATE, POTASSIUM CHLORIDE, CALCIUM CHLORIDE 600; 310; 30; 20 MG/100ML; MG/100ML; MG/100ML; MG/100ML
75 INJECTION, SOLUTION INTRAVENOUS CONTINUOUS
Status: DISCONTINUED | OUTPATIENT
Start: 2022-03-09 | End: 2022-03-11 | Stop reason: HOSPADM

## 2022-03-09 RX ADMIN — FAMOTIDINE 20 MG: 10 INJECTION, SOLUTION INTRAVENOUS at 13:20

## 2022-03-09 RX ADMIN — FENTANYL CITRATE 50 MCG: 50 INJECTION, SOLUTION INTRAMUSCULAR; INTRAVENOUS at 13:28

## 2022-03-09 RX ADMIN — LABETALOL 20 MG/4 ML (5 MG/ML) INTRAVENOUS SYRINGE 40 MG: at 01:08

## 2022-03-09 RX ADMIN — BUPIVACAINE HYDROCHLORIDE 10 ML: 5 INJECTION, SOLUTION EPIDURAL; INTRACAUDAL; PERINEURAL at 04:44

## 2022-03-09 RX ADMIN — IBUPROFEN 600 MG: 600 TABLET ORAL at 15:42

## 2022-03-09 RX ADMIN — LIDOCAINE HYDROCHLORIDE AND EPINEPHRINE 10 ML: 20; 5 INJECTION, SOLUTION EPIDURAL; INFILTRATION; INTRACAUDAL; PERINEURAL at 13:08

## 2022-03-09 RX ADMIN — CARBOPROST TROMETHAMINE 250 MCG: 250 INJECTION, SOLUTION INTRAMUSCULAR at 14:04

## 2022-03-09 RX ADMIN — ONDANSETRON 4 MG: 2 INJECTION INTRAMUSCULAR; INTRAVENOUS at 21:44

## 2022-03-09 RX ADMIN — CEFAZOLIN SODIUM 2 G: 2 INJECTION, SOLUTION INTRAVENOUS at 14:47

## 2022-03-09 RX ADMIN — Medication 800 ML/HR: at 14:00

## 2022-03-09 RX ADMIN — BUPIVACAINE HYDROCHLORIDE 7 ML: 5 INJECTION, SOLUTION EPIDURAL; INTRACAUDAL; PERINEURAL at 14:03

## 2022-03-09 RX ADMIN — LABETALOL 20 MG/4 ML (5 MG/ML) INTRAVENOUS SYRINGE 20 MG: at 00:53

## 2022-03-09 RX ADMIN — LIDOCAINE HYDROCHLORIDE AND EPINEPHRINE 5 ML: 20; 5 INJECTION, SOLUTION EPIDURAL; INFILTRATION; INTRACAUDAL; PERINEURAL at 11:54

## 2022-03-09 RX ADMIN — SODIUM CHLORIDE, POTASSIUM CHLORIDE, SODIUM LACTATE AND CALCIUM CHLORIDE 125 ML/HR: 600; 310; 30; 20 INJECTION, SOLUTION INTRAVENOUS at 14:51

## 2022-03-09 RX ADMIN — LIDOCAINE HYDROCHLORIDE AND EPINEPHRINE 5 ML: 20; 5 INJECTION, SOLUTION EPIDURAL; INFILTRATION; INTRACAUDAL; PERINEURAL at 08:51

## 2022-03-09 RX ADMIN — ACETAMINOPHEN 650 MG: 325 TABLET ORAL at 02:10

## 2022-03-09 RX ADMIN — EPHEDRINE SULFATE 10 MG: 50 INJECTION, SOLUTION INTRAVENOUS at 13:31

## 2022-03-09 RX ADMIN — LABETALOL HYDROCHLORIDE 200 MG: 200 TABLET, FILM COATED ORAL at 07:23

## 2022-03-09 RX ADMIN — METOCLOPRAMIDE 10 MG: 5 INJECTION, SOLUTION INTRAMUSCULAR; INTRAVENOUS at 13:57

## 2022-03-09 RX ADMIN — METOCLOPRAMIDE 10 MG: 5 INJECTION, SOLUTION INTRAMUSCULAR; INTRAVENOUS at 13:32

## 2022-03-09 RX ADMIN — MAGNESIUM SULFATE IN WATER 2 G/HR: 40 INJECTION, SOLUTION INTRAVENOUS at 16:11

## 2022-03-09 RX ADMIN — MIDAZOLAM 1 MG: 1 INJECTION INTRAMUSCULAR; INTRAVENOUS at 13:34

## 2022-03-09 RX ADMIN — Medication 1000 ML/HR: at 13:29

## 2022-03-09 RX ADMIN — CEFAZOLIN SODIUM 2 G: 2 INJECTION, SOLUTION INTRAVENOUS at 13:01

## 2022-03-09 RX ADMIN — SODIUM CITRATE AND CITRIC ACID MONOHYDRATE 30 ML: 500; 334 SOLUTION ORAL at 13:04

## 2022-03-09 RX ADMIN — ROPIVACAINE HYDROCHLORIDE 5 ML: 5 INJECTION, SOLUTION EPIDURAL; INFILTRATION; PERINEURAL at 08:51

## 2022-03-09 RX ADMIN — ROPIVACAINE HYDROCHLORIDE 5 ML: 5 INJECTION, SOLUTION EPIDURAL; INFILTRATION; PERINEURAL at 11:54

## 2022-03-09 RX ADMIN — LABETALOL HYDROCHLORIDE 200 MG: 200 TABLET, FILM COATED ORAL at 21:35

## 2022-03-09 RX ADMIN — MIDAZOLAM 1 MG: 1 INJECTION INTRAMUSCULAR; INTRAVENOUS at 13:15

## 2022-03-09 RX ADMIN — MORPHINE SULFATE 3 MG: 0.5 INJECTION, SOLUTION EPIDURAL; INTRATHECAL; INTRAVENOUS at 13:28

## 2022-03-09 RX ADMIN — ONDANSETRON 4 MG: 2 INJECTION INTRAMUSCULAR; INTRAVENOUS at 13:20

## 2022-03-09 RX ADMIN — HYDRALAZINE HYDROCHLORIDE 5 MG: 20 INJECTION INTRAMUSCULAR; INTRAVENOUS at 02:30

## 2022-03-09 RX ADMIN — LABETALOL 20 MG/4 ML (5 MG/ML) INTRAVENOUS SYRINGE 20 MG: at 08:38

## 2022-03-09 RX ADMIN — SODIUM CHLORIDE, POTASSIUM CHLORIDE, SODIUM LACTATE AND CALCIUM CHLORIDE 75 ML/HR: 600; 310; 30; 20 INJECTION, SOLUTION INTRAVENOUS at 23:16

## 2022-03-09 RX ADMIN — ACETAMINOPHEN 650 MG: 325 TABLET ORAL at 11:22

## 2022-03-09 RX ADMIN — SODIUM CHLORIDE, POTASSIUM CHLORIDE, SODIUM LACTATE AND CALCIUM CHLORIDE 125 ML/HR: 600; 310; 30; 20 INJECTION, SOLUTION INTRAVENOUS at 11:15

## 2022-03-09 RX ADMIN — ACETAMINOPHEN 650 MG: 325 TABLET ORAL at 16:16

## 2022-03-09 RX ADMIN — SODIUM CHLORIDE, POTASSIUM CHLORIDE, SODIUM LACTATE AND CALCIUM CHLORIDE: 600; 310; 30; 20 INJECTION, SOLUTION INTRAVENOUS at 13:42

## 2022-03-09 RX ADMIN — LABETALOL 20 MG/4 ML (5 MG/ML) INTRAVENOUS SYRINGE 20 MG: at 02:08

## 2022-03-09 NOTE — PLAN OF CARE
Problem: Adult Inpatient Plan of Care  Goal: Plan of Care Review  Outcome: Ongoing, Progressing  Flowsheets (Taken 3/9/2022 8089)  Progress: no change  Plan of Care Reviewed With:   patient   spouse  Goal: Patient-Specific Goal (Individualized)  Outcome: Ongoing, Progressing  Goal: Absence of Hospital-Acquired Illness or Injury  Outcome: Ongoing, Progressing  Goal: Optimal Comfort and Wellbeing  Outcome: Ongoing, Progressing  Goal: Readiness for Transition of Care  Outcome: Ongoing, Progressing     Problem: Bleeding (Labor)  Goal: Hemostasis  Outcome: Ongoing, Progressing     Problem: Change in Fetal Wellbeing (Labor)  Goal: Stable Fetal Wellbeing  Outcome: Ongoing, Progressing     Problem: Delayed Labor Progression (Labor)  Goal: Effective Progression to Delivery  Outcome: Ongoing, Progressing     Problem: Infection (Labor)  Goal: Absence of Infection Signs and Symptoms  Outcome: Ongoing, Progressing     Problem: Labor Pain (Labor)  Goal: Acceptable Pain Control  Outcome: Ongoing, Progressing     Problem: Uterine Tachysystole (Labor)  Goal: Normal Uterine Contraction Pattern  Outcome: Ongoing, Progressing   Goal Outcome Evaluation:  Plan of Care Reviewed With: patient, spouse        Progress: no change

## 2022-03-09 NOTE — PROGRESS NOTES
"03/09/22  10:03 EST  Kasey Colbert    SUBJECTIVE: comfortable after epidural redose     ASSESSMENTS:     /65   Pulse 84   Temp 97.8 °F (36.6 °C) (Oral)   Resp 18   Ht 170.2 cm (67\")   Wt 99.8 kg (220 lb)   SpO2 98%   Breastfeeding Yes   BMI 34.46 kg/m²     Fetal Heart Rate Assessment   Method: Fetal HR Assessment Method: external   Beats/min: Fetal HR (beats/min): 130   Baseline: Fetal HR Baseline: normal range   Varibility: Fetal HR Variability: minimal (detectable, amplitude less than or equal to 5 bpm)   Accels: Fetal HR Accelerations: absent   Decels: Fetal HR Decelerations: early    Tracing Category:  1     Uterine Assessment   Method: Method: IUPC (intrauterine pressure catheter)   Frequency (min): Contraction Frequency (Minutes): 2-3   Ctx Count in 10 min: Contractions in 10 Minutes: x2   Duration:     Intensity: Contraction Intensity: moderate by palpation   Intensity by IUPC:     Resting Tone: Uterine Resting Tone: soft by palpation   Resting Tone by IUPC:       Presentation: Vtx   Cervix: Exam by: Method: sterile exam per physician (mami)   Dilation: Cervical Dilation (cm): 5 (forbag ruptured with exam , clear fluid noted)   Effacement: Cervical Effacement: 70-80%   Station: Fetal Station: -1     IUPC placed without incident to 45cm       PLAN:  1. CHTN w/ LIU: magnesium 4g bolus then 2g/hr,.  Tolerating well, no si/sx of toxicity. Severe range BPs responded to labetalol protocol.   2. Repeat SVE in 4h or prn.  Has been unchanged >12h.  Discussed possible need for PCS for delivery if no cervical change  3. SCDs.     Diana Hamilton MD  10:03 EST  03/09/22       "

## 2022-03-09 NOTE — ANESTHESIA PREPROCEDURE EVALUATION
Anesthesia Evaluation     Patient summary reviewed and Nursing notes reviewed                Airway   Mallampati: I  TM distance: >3 FB  Neck ROM: full  No difficulty expected  Dental - normal exam     Pulmonary - negative pulmonary ROS and normal exam   Cardiovascular - normal exam    (+) hypertension,       Neuro/Psych- negative ROS  GI/Hepatic/Renal/Endo    (+)  GERD,  diabetes mellitus gestational well controlled,     Musculoskeletal (-) negative ROS    Abdominal  - normal exam    Bowel sounds: normal.   Substance History - negative use     OB/GYN    (+) Pregnant,         Other                        Anesthesia Plan    ASA 2 - emergent     epidural       Anesthetic plan, all risks, benefits, and alternatives have been provided, discussed and informed consent has been obtained with: patient.  Use of blood products discussed with patient .   Plan discussed with attending.        CODE STATUS:    Code Status (Patient has no pulse and is not breathing): CPR (Attempt to Resuscitate)  Medical Interventions (Patient has pulse or is breathing): Full Support

## 2022-03-09 NOTE — PLAN OF CARE
Problem: Bleeding (Labor)  Goal: Hemostasis  3/9/2022 1448 by Rosalia Agrawal RN  Outcome: Unable to Meet, Plan Revised  3/9/2022 0759 by Rosalia Agrawal RN  Outcome: Ongoing, Progressing     Problem: Change in Fetal Wellbeing (Labor)  Goal: Stable Fetal Wellbeing  3/9/2022 1448 by Rosalia Agrawal RN  Outcome: Unable to Meet, Plan Revised  3/9/2022 0759 by Rosalia Agrawal RN  Outcome: Ongoing, Progressing     Problem: Delayed Labor Progression (Labor)  Goal: Effective Progression to Delivery  3/9/2022 1448 by Rosalia Agrawal RN  Outcome: Unable to Meet, Plan Revised  3/9/2022 0759 by Rosalia Agrawal RN  Outcome: Ongoing, Progressing     Problem: Infection (Labor)  Goal: Absence of Infection Signs and Symptoms  3/9/2022 1448 by Rosalia Agrawal RN  Outcome: Unable to Meet, Plan Revised  3/9/2022 0759 by Rosalia Agrawal RN  Outcome: Ongoing, Progressing     Problem: Labor Pain (Labor)  Goal: Acceptable Pain Control  3/9/2022 1448 by Rosalia Agrawal RN  Outcome: Unable to Meet, Plan Revised  3/9/2022 0759 by Rosalia Agrawal RN  Outcome: Ongoing, Progressing     Problem: Uterine Tachysystole (Labor)  Goal: Normal Uterine Contraction Pattern  3/9/2022 1448 by Rosalia Agrawal RN  Outcome: Unable to Meet, Plan Revised  3/9/2022 0759 by Rosalia Agrawal RN  Outcome: Ongoing, Progressing   Goal Outcome Evaluation:  Plan of Care Reviewed With: patient, spouse        Progress: no change     Problem: Bleeding (Labor)  Goal: Hemostasis  3/9/2022 1448 by Rosalia Agrawal RN  Outcome: Unable to Meet, Plan Revised  3/9/2022 0759 by Rosalia Agrawal RN  Outcome: Ongoing, Progressing     Problem: Change in Fetal Wellbeing (Labor)  Goal: Stable Fetal Wellbeing  3/9/2022 1448 by Rosalia Agrawal RN  Outcome: Unable to Meet, Plan Revised  3/9/2022 0759 by Rosalia Agrawal RN  Outcome: Ongoing, Progressing     Problem: Delayed Labor Progression (Labor)  Goal: Effective Progression to Delivery  3/9/2022 1448 by Rosalia Agrawal  A, RN  Outcome: Unable to Meet, Plan Revised  3/9/2022 0759 by Rosalia Agrawal RN  Outcome: Ongoing, Progressing     Problem: Infection (Labor)  Goal: Absence of Infection Signs and Symptoms  3/9/2022 1448 by Rosalia Agrawal RN  Outcome: Unable to Meet, Plan Revised  3/9/2022 0759 by Rosalia Agrawal RN  Outcome: Ongoing, Progressing     Problem: Labor Pain (Labor)  Goal: Acceptable Pain Control  3/9/2022 1448 by Rosalia Agrawal RN  Outcome: Unable to Meet, Plan Revised  3/9/2022 0759 by Rosalia Agrawal RN  Outcome: Ongoing, Progressing     Problem: Uterine Tachysystole (Labor)  Goal: Normal Uterine Contraction Pattern  3/9/2022 1448 by Rosalia Agrawal RN  Outcome: Unable to Meet, Plan Revised  3/9/2022 0759 by Rosalia Agrawal RN  Outcome: Ongoing, Progressing

## 2022-03-09 NOTE — ANESTHESIA PROCEDURE NOTES
Labor Epidural      Patient reassessed immediately prior to procedure    Patient location during procedure: OB  Start Time: 3/8/2022 11:01 PM  Stop Time: 3/8/2022 11:24 PM  Performed By  Anesthesiologist: Gabriel Martinez MD  Preanesthetic Checklist  Completed: patient identified, IV checked, site marked, risks and benefits discussed, surgical consent, monitors and equipment checked, pre-op evaluation and timeout performed  Prep:  Pt Position:sitting  Sterile Tech:cap, gloves, mask and sterile barrier  Prep:DuraPrep  Monitoring:blood pressure monitoring  Epidural Block Procedure:  Approach:midline  Guidance:landmark technique  Location:L3-L4  Needle Type:Tuohy  Needle Gauge:17 G  Loss of Resistance Medium: air  Loss of Resistance: 5cm  Cath Depth at skin:15 cm  Paresthesia: none  Aspiration:negative  Test Dose:negative  Med administered at 3/8/2022 11:11 PM  Number of Attempts: 1  Post Assessment:  Dressing:occlusive dressing applied and secured with tape  Pt Tolerance:patient tolerated the procedure well with no apparent complications  Complications:no

## 2022-03-09 NOTE — PROGRESS NOTES
Repeat SVE reveals cervical edema and caput, so now 4/50/-3.  Discussed recommendation to proceed with PCS for delivery.  Discussed risks, benefits, and alternatives.  We discussed risks of blood loss, infection, damage to bowel, bladder, blood vessels, nerves, and ureters.  She is accepting of these risks and wishes to proceed with PCS.  Questions answered. Ancef/zithromax preop.  SCDs

## 2022-03-09 NOTE — NURSING NOTE
Dr Hamilton called on her cell,  Informed of pt bp back up to the 160's, blood tinged urine noted now,  Pitocin gtt at 24 mu, sve unchanged.  She will come to see pt and recheck soon for further poc.  Probably P C/S .

## 2022-03-09 NOTE — NURSING NOTE
Dr. Hamilton called and updated with BP readings since assuming care this morning and orders for magnesium sulfate received along with instructions to give IV labetalol as ordered.

## 2022-03-09 NOTE — ANESTHESIA POSTPROCEDURE EVALUATION
Patient: Kasey Craft Spegal    Procedure Summary     Date: 22 Room / Location: [facility] LABOR DELIVERY   [facility] LABOR DELIVERY    Anesthesia Start:  Anesthesia Stop: 22 142    Procedure:  SECTION PRIMARY (N/A Abdomen) Diagnosis:     Surgeons: Diana Hamilton MD Provider: Gabriel Martinez MD    Anesthesia Type: epidural ASA Status: 2 - Emergent          Anesthesia Type: epidural    Vitals  Vitals Value Taken Time   /71 22 1418   Temp 98 °F (36.7 °C) 22 1418   Pulse 88 22 1420   Resp 18 22 1418   SpO2 96 % 22 1420   Vitals shown include unvalidated device data.        Post Anesthesia Care and Evaluation    Patient location during evaluation: bedside  Patient participation: complete - patient participated  Level of consciousness: awake and alert  Pain score: 0  Pain management: adequate  Airway patency: patent  Anesthetic complications: No anesthetic complications    Cardiovascular status: acceptable  Respiratory status: acceptable  Hydration status: acceptable

## 2022-03-09 NOTE — OP NOTE
Section Procedure Note    Kasey Colbert    3/9/2022     Indications: 1. IUP at 39w4d   2. Failure to progress    3. SROM    4. A1GDM    5. CHTN with superimposed preeclampsia with severe features    Pre-operative Diagnosis: 39w4d    Post-operative Diagnosis: same    Findings: severe endometriosis with scarring of ovaries into posterior cul de sac, endometriosis covering uterus and ovaries.  Uterine atony.      Estimated Blood Loss:  1597mL           Drains: Vazquez                 Specimens: None               Complications:  Intraoperative hemorrhage due to uterine atony and bleeding from endometriosis lesions on uterus           Disposition: PACU - hemodynamically stable.           Condition: stable    Surgeon: Diana Haimlton MD     Assistants: tech    Anesthesia: Epidural anesthesia    ASA Class: 2    Procedure Details   The patient was seen in the Holding Room. The risks, benefits, complications, treatment options, and expected outcomes were discussed with the patient.  The patient concurred with the proposed plan, giving informed consent.  The site of surgery was properly noted. The patient was taken to the Operating Room, identified as Kasey Colbert and the procedure verified as  Delivery. A Time Out was held and the above information confirmed.    After induction of anesthesia, the patient was draped and prepped in the usual sterile manner. A Pfannenstiel incision was made and carried down through the subcutaneous tissue to the fascia. A fascial incision was made and extended transversely. The fascia was  from the underlying rectus tissue superiorly and inferiorly. The peritoneum was identified and entered. The peritoneal incision was extended longitudinally.   A low transverse uterine incision was made. Delivered from vertex presentation was a male infant with a birth weight of 3289 g (7 lb 4 oz)  with apgar scores of         APGARS  One minute Five  minutes Ten minutes Fifteen minutes Twenty minutes   Skin color: 0   1             Heart rate: 2   2             Grimace: 2   2              Muscle tone: 2   2              Breathin   1              Totals: 7   8              . After the umbilical cord was clamped and cut, cord blood was obtained for evaluation. The placenta was removed intact and appeared normal. The uterine outline, tubes and ovaries were inspected with the findings noted above. There was bleeding from the left uterine artery so it was grasped with a ring forcep and a figure of eight suture was placed around it.  The uterine incision was closed with running locked sutures of 1 chromic and an imbricating layer of 1 chromic.  There was continued bleeding from the left corner of the hysterotomy so an O'Labolt suture was placed superior and inferior to the hysterotomy.  There was then oozing from the serosa where endometriosis was present.  Several figure of eight sutures of 2-0 chromic were placed over the bleeding lesions.  Surgicel powder was placed over these lesions and the hysterotomy. Hemostasis was observed.  The bladder was backfilled with 200cc of sterile milk due to the sutures placed in the left corner of the hysterotomy.  The bladder was well inferior to the sutures.  A dose of hemabate was given for uterine atony.  The fascia was then reapproximated with running sutures of 0 vicryl. The subcutaneous tissue was reapproximated with 3-0 plain. The skin was reapproximated with insorb subcuticular staples and reinforced with skin glue.      Instrument, sponge, and needle counts were correct prior the abdominal closure and at the conclusion of the case.     She was given an additional 2g of ancef in recovery due to blood loss >1500mL.         Diana Hamilton MD  14:22 EST  3/9/2022

## 2022-03-10 LAB
GLUCOSE BLDC GLUCOMTR-MCNC: 103 MG/DL (ref 70–130)
HCT VFR BLD AUTO: 27 % (ref 34–46.6)
HGB BLD-MCNC: 9 G/DL (ref 12–15.9)

## 2022-03-10 PROCEDURE — 85018 HEMOGLOBIN: CPT | Performed by: OBSTETRICS & GYNECOLOGY

## 2022-03-10 PROCEDURE — 82962 GLUCOSE BLOOD TEST: CPT

## 2022-03-10 PROCEDURE — 0 MAGNESIUM SULFATE 20 GM/500ML SOLUTION: Performed by: OBSTETRICS & GYNECOLOGY

## 2022-03-10 PROCEDURE — 85014 HEMATOCRIT: CPT | Performed by: OBSTETRICS & GYNECOLOGY

## 2022-03-10 RX ORDER — SIMETHICONE 80 MG
80 TABLET,CHEWABLE ORAL 4 TIMES DAILY PRN
Status: DISCONTINUED | OUTPATIENT
Start: 2022-03-10 | End: 2022-03-11 | Stop reason: HOSPADM

## 2022-03-10 RX ORDER — POLYETHYLENE GLYCOL 3350 17 G/17G
17 POWDER, FOR SOLUTION ORAL DAILY
Status: DISCONTINUED | OUTPATIENT
Start: 2022-03-10 | End: 2022-03-11 | Stop reason: HOSPADM

## 2022-03-10 RX ADMIN — OXYCODONE 5 MG: 5 TABLET ORAL at 08:07

## 2022-03-10 RX ADMIN — IBUPROFEN 600 MG: 600 TABLET ORAL at 12:04

## 2022-03-10 RX ADMIN — Medication 325 MG: at 18:04

## 2022-03-10 RX ADMIN — SIMETHICONE 80 MG: 80 TABLET, CHEWABLE ORAL at 13:15

## 2022-03-10 RX ADMIN — IBUPROFEN 600 MG: 600 TABLET ORAL at 18:04

## 2022-03-10 RX ADMIN — POLYETHYLENE GLYCOL 3350 17 G: 17 POWDER, FOR SOLUTION ORAL at 14:01

## 2022-03-10 RX ADMIN — LABETALOL HYDROCHLORIDE 200 MG: 200 TABLET, FILM COATED ORAL at 23:38

## 2022-03-10 RX ADMIN — LABETALOL HYDROCHLORIDE 200 MG: 200 TABLET, FILM COATED ORAL at 08:07

## 2022-03-10 RX ADMIN — IBUPROFEN 600 MG: 600 TABLET ORAL at 06:50

## 2022-03-10 RX ADMIN — ACETAMINOPHEN 650 MG: 325 TABLET ORAL at 04:48

## 2022-03-10 RX ADMIN — ACETAMINOPHEN 650 MG: 325 TABLET ORAL at 12:05

## 2022-03-10 RX ADMIN — ACETAMINOPHEN 650 MG: 325 TABLET ORAL at 18:04

## 2022-03-10 RX ADMIN — ACETAMINOPHEN 650 MG: 325 TABLET ORAL at 23:38

## 2022-03-10 RX ADMIN — SERTRALINE HYDROCHLORIDE 50 MG: 50 TABLET ORAL at 06:15

## 2022-03-10 RX ADMIN — MAGNESIUM SULFATE IN WATER 2 G/HR: 40 INJECTION, SOLUTION INTRAVENOUS at 04:52

## 2022-03-10 RX ADMIN — Medication 325 MG: at 08:07

## 2022-03-10 RX ADMIN — IBUPROFEN 600 MG: 600 TABLET ORAL at 23:38

## 2022-03-10 RX ADMIN — OXYCODONE 5 MG: 5 TABLET ORAL at 15:54

## 2022-03-10 NOTE — ANESTHESIA POSTPROCEDURE EVALUATION
Patient: Kasey Craft Spegal    Procedure Summary     Date: 22 Room / Location: [facility] LABOR DELIVERY   [facility] LABOR DELIVERY    Anesthesia Start:  Anesthesia Stop: 22    Procedure:  SECTION PRIMARY (N/A Abdomen) Diagnosis:     Surgeons: Diana Hamilton MD Provider: Gabriel Martinez MD    Anesthesia Type: epidural ASA Status: 2 - Emergent          Anesthesia Type: epidural    Vitals  Vitals Value Taken Time   /68 03/10/22 0638   Temp 97.2 °F (36.2 °C) 22 192   Pulse 81 03/10/22 06   Resp 16 03/10/22 0638   SpO2 96 % 03/10/22 0638           Post Anesthesia Care and Evaluation    Patient location during evaluation: bedside  Patient participation: complete - patient participated  Level of consciousness: awake and alert  Pain management: adequate  Airway patency: patent  Anesthetic complications: No anesthetic complications    Cardiovascular status: acceptable  Respiratory status: acceptable  Hydration status: acceptable  Post Neuraxial Block status: Motor and sensory function returned to baseline and No signs or symptoms of PDPH

## 2022-03-10 NOTE — LACTATION NOTE
03/10/22 1026   Maternal Information   Date of Referral 03/10/22   Person Making Referral lactation consultant   Maternal Reason for Referral no prior breastfeeding experience   Maternal Assessment   Breast Size Issue none   Nipples Bilateral:;short   Left Nipple Symptoms intact;nontender   Right Nipple Symptoms intact;nontender   Maternal Infant Feeding   Maternal Emotional State receptive;relaxed   Infant Positioning clutch/football  (left and right)   Signs of Milk Transfer other (see comments)  (baby is uncoordinated at the breast)   Pain with Feeding no   Comfort Measures Before/During Feeding infant position adjusted;latch adjusted;maternal position adjusted  (small sheld)   Latch Assistance full assistance needed   Support Person Involvement actively supporting mother   Additional Documentation Breastfeeding Supplementation (Group)   Breastfeeding Supplementation   Method of Supplementation paced bottle  (education compelted)   Milk Expression/Equipment   Breast Pump Type double electric, personal  (Medela at bedside)   Breast Pumping   Breast Pumping Interventions post-feed pumping encouraged  (for short or missed feedings)

## 2022-03-10 NOTE — PROGRESS NOTES
3/10/2022    Name:Kasey Colbert    MR#:[id number]     PROGRESS NOTE:  Post-Op 1 S/P        Subjective   31 y.o. yo Female  s/p CS at 39w4d doing well. Pain well controlled, lochia appropriate.  Feels tired from magnesium. No HA, vision changes, RUQ pain, palpitations, SOB    Patient Active Problem List   Diagnosis   • Antepartum bleeding, second trimester   • Endometriosis   • Prediabetes   • GDM (gestational diabetes mellitus), class A1   • Chronic hypertension during pregnancy   • Chronic hypertension with superimposed preeclampsia        Objective    Vitals  Temp:  Temp:  [97.2 °F (36.2 °C)-98.4 °F (36.9 °C)] 97.2 °F (36.2 °C)  Temp src: Oral  BP:  BP: (101-178)/(54-89) 106/68  Pulse:  Heart Rate:  [76-94] 81  RR:   Resp:  [16-18] 16    General Awake, alert, no distress  Abdomen Soft, non-distended, fundus firm, below umbilicus, appropriately tender  Incision  Intact, no erythema or exudate  Extremities Calves NT bilaterally     I/O last 3 completed shifts:  In: 6214.1 [P.O.:240; I.V.:5467.4; Other:300]  Out: 4692 [Urine:3095; Blood:1597]    LABS:   Lab Results   Component Value Date    WBC 12.38 (H) 2022    HGB 8.7 (L) 2022    HCT 25.8 (L) 2022    MCV 84.0 2022     2022       Infant: male       Assessment/Plan  1.  POD 1 from  Section  2. CHTN w/ LIU with severe features: on magnesium at 2g/h. No si/sx of toxicity.  Excellent UOP.  Continue home labetalol 200mg po q8h.  Continue magnesium until 24h PP, then transfer to MBU if BPs <150s/100s  3. Blood loss anemia from delivery due to uterine atony: H&H stable this AM.  Vitals normal, excellent UOP.  Asymptomatic from anemia.  Po iron ordered.  Repeat H&H in AM or prn if develops symptoms of anemia or vital sign abnormalities.     4. SCDs  5. Routine postop cares                  Diana Hamilton MD  3/10/2022 07:45 EST

## 2022-03-10 NOTE — LACTATION NOTE
03/09/22 1830   Maternal Information   Person Making Referral nurse   Maternal Reason for Referral   (first time breastfeeder;  mom on APU and on magnesium sulfate)   Infant Reason for Referral   (reports breastfeeding well; plans to give formula tonight)   Milk Expression/Equipment   Breast Pump Type double electric, personal  (dad going home and will bring back pump; discussed how to use Medela Pump In Style)   Breast Pumping   Breast Pumping Interventions   (pump when giving formula to get best milk supply possible)   Teaching done as documented under Education. To call lactation services, if there are questions or concerns or if mom wants help with a feeding.    yes

## 2022-03-10 NOTE — SIGNIFICANT NOTE
POD 0 s/p 1 CD in the setting of LIU c/b intraoperative hemorrhage.   Bps have been normal range. UOP adequate.   Sleeping comfortably. VS are stable with no tachycardia. Lochia is minimal    WBC   Date Value Ref Range Status   03/09/2022 13.40 (H) 3.40 - 10.80 10*3/mm3 Final     RBC   Date Value Ref Range Status   03/09/2022 3.19 (L) 3.77 - 5.28 10*6/mm3 Final     Hemoglobin   Date Value Ref Range Status   03/09/2022 9.2 (L) 12.0 - 15.9 g/dL Final     Hematocrit   Date Value Ref Range Status   03/09/2022 26.7 (L) 34.0 - 46.6 % Final     MCV   Date Value Ref Range Status   03/09/2022 83.7 79.0 - 97.0 fL Final     MCH   Date Value Ref Range Status   03/09/2022 28.8 26.6 - 33.0 pg Final     MCHC   Date Value Ref Range Status   03/09/2022 34.5 31.5 - 35.7 g/dL Final     RDW   Date Value Ref Range Status   03/09/2022 13.4 12.3 - 15.4 % Final     RDW-SD   Date Value Ref Range Status   03/09/2022 40.5 37.0 - 54.0 fl Final     MPV   Date Value Ref Range Status   03/09/2022 11.0 6.0 - 12.0 fL Final     Platelets   Date Value Ref Range Status   03/09/2022 185 140 - 450 10*3/mm3 Final     Pt currently stable. Hct with significant drop from 35.6-26. Currently asx. Will monitor closely. Rpt CBC at [facility].     Kasey Krishnamurthy MD

## 2022-03-10 NOTE — PLAN OF CARE
Problem: Breastfeeding  Goal: Effective Breastfeeding  3/9/2022 2232 by Barrett Baca APRN  Outcome: Ongoing, Progressing  3/9/2022 2230 by Barrett Baca APRN  Outcome: Ongoing, Progressing  Intervention: Support Exclusive Breastfeeding Success  Flowsheets (Taken 3/9/2022 1830)  Supportive Measures: active listening utilized  Breastfeeding Support:   diary/feeding log utilized   encouragement provided   lactation counseling provided  Intervention: Promote Effective Breastfeeding  Flowsheets (Taken 3/9/2022 1830)  Breastfeeding Assistance:   feeding cue recognition promoted   feeding on demand promoted   support offered  Parent/Child Attachment Promotion:   caring behavior modeled   cue recognition promoted   participation in care promoted   positive reinforcement provided   strengths emphasized   Goal Outcome Evaluation:

## 2022-03-11 VITALS
BODY MASS INDEX: 34.53 KG/M2 | HEART RATE: 90 BPM | OXYGEN SATURATION: 98 % | TEMPERATURE: 98.4 F | RESPIRATION RATE: 16 BRPM | WEIGHT: 220 LBS | DIASTOLIC BLOOD PRESSURE: 61 MMHG | HEIGHT: 67 IN | SYSTOLIC BLOOD PRESSURE: 115 MMHG

## 2022-03-11 PROBLEM — Z98.891 S/P CESAREAN SECTION: Status: ACTIVE | Noted: 2022-03-11

## 2022-03-11 RX ORDER — DOCUSATE SODIUM 100 MG/1
100 CAPSULE, LIQUID FILLED ORAL 2 TIMES DAILY
Qty: 60 CAPSULE | Refills: 1 | Status: SHIPPED | OUTPATIENT
Start: 2022-03-11

## 2022-03-11 RX ORDER — OXYCODONE HYDROCHLORIDE 5 MG/1
5 TABLET ORAL EVERY 4 HOURS PRN
Qty: 15 TABLET | Refills: 0 | Status: SHIPPED | OUTPATIENT
Start: 2022-03-11 | End: 2022-03-16

## 2022-03-11 RX ORDER — IBUPROFEN 600 MG/1
600 TABLET ORAL EVERY 6 HOURS SCHEDULED
Qty: 30 TABLET | Refills: 0 | Status: SHIPPED | OUTPATIENT
Start: 2022-03-11

## 2022-03-11 RX ADMIN — IBUPROFEN 600 MG: 600 TABLET ORAL at 06:43

## 2022-03-11 RX ADMIN — Medication 325 MG: at 09:09

## 2022-03-11 RX ADMIN — POLYETHYLENE GLYCOL 3350 17 G: 17 POWDER, FOR SOLUTION ORAL at 09:10

## 2022-03-11 RX ADMIN — ACETAMINOPHEN 650 MG: 325 TABLET ORAL at 12:35

## 2022-03-11 RX ADMIN — LABETALOL HYDROCHLORIDE 200 MG: 200 TABLET, FILM COATED ORAL at 09:09

## 2022-03-11 RX ADMIN — OXYCODONE 5 MG: 5 TABLET ORAL at 03:55

## 2022-03-11 RX ADMIN — ACETAMINOPHEN 650 MG: 325 TABLET ORAL at 06:43

## 2022-03-11 RX ADMIN — IBUPROFEN 600 MG: 600 TABLET ORAL at 12:35

## 2022-03-11 RX ADMIN — SERTRALINE HYDROCHLORIDE 50 MG: 50 TABLET ORAL at 09:09

## 2022-03-11 NOTE — DISCHARGE SUMMARY
[facility]   Discharge Summary      Patient: Kasey Colbert      MR#:[id number]  Admission  Diagnosis:   Problems Addressed this Visit        Gravid and     S/P  section - Primary    Relevant Medications    oxyCODONE (ROXICODONE) 5 MG immediate release tablet    RESOLVED: Term pregnancy    Relevant Medications    methylergonovine (METHERGINE) injection 200 mcg    carboprost (HEMABATE) injection 250 mcg    miSOPROStol (CYTOTEC) tablet 800 mcg      Diagnoses       Codes Comments    S/P  section    -  Primary ICD-10-CM: Z98.891  ICD-9-CM: V45.89     Term pregnancy     ICD-10-CM: Z34.90  ICD-9-CM: V22.1           Discharge Diagnosis:   1. S/P  section    2. Term pregnancy        Date of Admission: 3/8/2022  Date of Discharge:  3/11/2022    Procedures:  , Low Transverse     3/9/2022    1:27 PM      Service:  Obstetrics    Hospital Course:  Patient underwent  section and remained in the hospital for 3 days.  She was on magnesium for LIU and it was continued for 24h PP.  She received IV labetalol prn severe BPs.  Postpartum, her BPs improved.  CS complicated by intraoperative hemorrhage due to uterine atony.  She received hemabate with good response.  Her labs were stable and she was asymptomatic from her anemia.  She remained afebrile and hemodynamically stable.  On the day of discharge, she was eating, ambulating and voiding without difficulty.      Labs  Lab Results   Component Value Date    WBC 12.38 (H) 2022    HGB 9.0 (L) 03/10/2022    HCT 27.0 (L) 03/10/2022    MCV 84.0 2022     2022    URICACID 5.4 2022    AST 33 (H) 2022    ALT 15 2022     (H) 2022     Results from last 7 days   Lab Units 22  0856   ABO TYPING  A   RH TYPING  Positive   ANTIBODY SCREEN  Negative       Discharge Medications     Discharge Medications      New Medications      Instructions Start Date   docusate  sodium 100 MG capsule  Commonly known as: Colace   100 mg, Oral, 2 Times Daily      ibuprofen 600 MG tablet  Commonly known as: ADVIL,MOTRIN   600 mg, Oral, Every 6 Hours Scheduled      oxyCODONE 5 MG immediate release tablet  Commonly known as: ROXICODONE   5 mg, Oral, Every 4 Hours PRN         Changes to Medications      Instructions Start Date   glucose blood test strip  What changed: Another medication with the same name was removed. Continue taking this medication, and follow the directions you see here.   Check glucose 4 times daily         Continue These Medications      Instructions Start Date   FeroSul 325 (65 FE) MG tablet  Generic drug: ferrous sulfate   1 tablet, Oral, 2 Times Daily      glucose monitor monitoring kit   Use to check blood sugar 4 times daily.      labetalol 200 MG tablet  Commonly known as: NORMODYNE   200 mg, Oral, 2 Times Daily      Lancets Misc. kit   Use to check blood sugars 4 times daily.      ONE TOUCH ULTRA 2 w/Device kit   USE AS DIRECTED FIVE TIMES DAILY      OneTouch Delica Plus [id number] misc   USE TO CHECK BLOOD SUGARS 4 TIMES DAILY      pantoprazole 20 MG EC tablet  Commonly known as: PROTONIX   20 mg, Oral, Daily PRN      Prenatal 27-1 27-1 MG tablet tablet   1 tablet, Oral, Daily      sertraline 50 MG tablet  Commonly known as: ZOLOFT   50 mg, Oral, Daily         Stop These Medications    aspirin 81 MG chewable tablet     promethazine 25 MG tablet  Commonly known as: PHENERGAN            Discharge Disposition:  To Home    Discharge Condition:  Stable    Discharge Diet: regular    Activity at Discharge: pelvic rest    Follow-up Appointments  1 w BP check    Diana Hamilton MD  03/11/22  08:51 EST

## 2022-03-11 NOTE — PROGRESS NOTES
3/11/2022    Name:Kasey Colbert    MR#:[id number]     PROGRESS NOTE:  Post-Op 2 S/P        Subjective   31 y.o. yo Female  s/p CS at 39w4d doing well. Pain well controlled, lochia appropriate, tolerating diet. No HA, vision changes, RUQ/EG pain.  Would like to go home    Patient Active Problem List   Diagnosis   • Antepartum bleeding, second trimester   • Endometriosis   • Prediabetes   • GDM (gestational diabetes mellitus), class A1   • Chronic hypertension during pregnancy   • Chronic hypertension with superimposed preeclampsia        Objective    Vitals  Temp:  Temp:  [97.9 °F (36.6 °C)-98.3 °F (36.8 °C)] 98.1 °F (36.7 °C)  Temp src: Oral  BP:  BP: ()/(52-77) 142/71  Pulse:  Heart Rate:  [] 86  RR:   Resp:  [15-20] 16    General Awake, alert, no distress  Abdomen Soft, non-distended, fundus firm, below umbilicus, appropriately tender  Incision  Intact, no erythema or exudate  Extremities Calves NT bilaterally     I/O last 3 completed shifts:  In: -   Out: 2395 [Urine:2395]    LABS:   Lab Results   Component Value Date    WBC 12.38 (H) 2022    HGB 9.0 (L) 03/10/2022    HCT 27.0 (L) 03/10/2022    MCV 84.0 2022     2022       Infant: male       Assessment/Plan  1.  POD 2 from  Section  2. CHTN w/ LIU with severe features:s/p magnesium in labor and for 24h PP.  Continue home labetalol 200mg po q8h.   will continue to monitor at home  3. Blood loss anemia from delivery due to uterine atony: H&H stable.  Vitals normal.  Asymptomatic from anemia.  Po iron ordered.    4. Discharge home  5. BP check in 1w          Diana Hamilton MD  3/11/2022 08:47 EST

## 2023-08-08 ENCOUNTER — TRANSCRIBE ORDERS (OUTPATIENT)
Dept: LAB | Facility: HOSPITAL | Age: 33
End: 2023-08-08
Payer: COMMERCIAL

## 2023-08-08 ENCOUNTER — LAB (OUTPATIENT)
Dept: LAB | Facility: HOSPITAL | Age: 33
End: 2023-08-08
Payer: COMMERCIAL

## 2023-08-08 DIAGNOSIS — N91.2 ABSENCE OF MENSTRUATION: Primary | ICD-10-CM

## 2023-08-08 DIAGNOSIS — Z01.419 ROUTINE GYNECOLOGICAL EXAMINATION: ICD-10-CM

## 2023-08-08 DIAGNOSIS — N91.2 ABSENCE OF MENSTRUATION: ICD-10-CM

## 2023-08-08 PROCEDURE — 84146 ASSAY OF PROLACTIN: CPT

## 2023-08-08 PROCEDURE — 82306 VITAMIN D 25 HYDROXY: CPT

## 2023-08-08 PROCEDURE — 84702 CHORIONIC GONADOTROPIN TEST: CPT

## 2023-08-08 PROCEDURE — 85027 COMPLETE CBC AUTOMATED: CPT

## 2023-08-08 PROCEDURE — 83001 ASSAY OF GONADOTROPIN (FSH): CPT

## 2023-08-08 PROCEDURE — 80061 LIPID PANEL: CPT

## 2023-08-08 PROCEDURE — 82607 VITAMIN B-12: CPT

## 2023-08-08 PROCEDURE — 84443 ASSAY THYROID STIM HORMONE: CPT

## 2023-08-08 PROCEDURE — 83036 HEMOGLOBIN GLYCOSYLATED A1C: CPT

## 2023-08-08 PROCEDURE — 36415 COLL VENOUS BLD VENIPUNCTURE: CPT

## 2023-08-09 LAB
25(OH)D3 SERPL-MCNC: 16.8 NG/ML (ref 30–100)
CHOLEST SERPL-MCNC: 172 MG/DL (ref 0–200)
DEPRECATED RDW RBC AUTO: 40.2 FL (ref 37–54)
ERYTHROCYTE [DISTWIDTH] IN BLOOD BY AUTOMATED COUNT: 14.6 % (ref 12.3–15.4)
FSH SERPL-ACNC: 2.48 MIU/ML
HBA1C MFR BLD: 6.3 % (ref 4.8–5.6)
HCG INTACT+B SERPL-ACNC: <1 MIU/ML
HCT VFR BLD AUTO: 37.5 % (ref 34–46.6)
HDLC SERPL QL: 5.06
HDLC SERPL-MCNC: 34 MG/DL (ref 40–60)
HGB BLD-MCNC: 12 G/DL (ref 12–15.9)
LDLC SERPL CALC-MCNC: 112 MG/DL (ref 0–100)
MCH RBC QN AUTO: 24.6 PG (ref 26.6–33)
MCHC RBC AUTO-ENTMCNC: 32 G/DL (ref 31.5–35.7)
MCV RBC AUTO: 77 FL (ref 79–97)
PLATELET # BLD AUTO: 321 10*3/MM3 (ref 140–450)
PMV BLD AUTO: 9.8 FL (ref 6–12)
PROLACTIN SERPL-MCNC: 14.6 NG/ML (ref 4.79–23.3)
RBC # BLD AUTO: 4.87 10*6/MM3 (ref 3.77–5.28)
TRIGL SERPL-MCNC: 146 MG/DL (ref 0–150)
TSH SERPL DL<=0.05 MIU/L-ACNC: 1.66 UIU/ML (ref 0.27–4.2)
VIT B12 BLD-MCNC: 297 PG/ML (ref 211–946)
VLDLC SERPL-MCNC: 26 MG/DL (ref 5–40)
WBC NRBC COR # BLD: 7.33 10*3/MM3 (ref 3.4–10.8)
